# Patient Record
Sex: FEMALE | Race: WHITE | NOT HISPANIC OR LATINO | URBAN - METROPOLITAN AREA
[De-identification: names, ages, dates, MRNs, and addresses within clinical notes are randomized per-mention and may not be internally consistent; named-entity substitution may affect disease eponyms.]

---

## 2017-10-06 ENCOUNTER — IMPORTED ENCOUNTER (OUTPATIENT)
Dept: URBAN - METROPOLITAN AREA CLINIC 31 | Facility: CLINIC | Age: 67
End: 2017-10-06

## 2017-10-06 PROBLEM — H47.211: Noted: 2017-10-06

## 2017-10-06 PROBLEM — H40.002: Noted: 2017-10-06

## 2017-10-06 PROBLEM — H25.13: Noted: 2017-10-06

## 2017-10-06 PROBLEM — H35.3131: Noted: 2017-10-06

## 2017-10-06 PROBLEM — H35.3122: Noted: 2017-10-06

## 2017-10-06 PROCEDURE — 92250 FUNDUS PHOTOGRAPHY W/I&R: CPT

## 2017-10-06 PROCEDURE — 92014 COMPRE OPH EXAM EST PT 1/>: CPT

## 2017-10-06 PROCEDURE — 92015 DETERMINE REFRACTIVE STATE: CPT

## 2017-10-06 NOTE — PATIENT DISCUSSION
1.  ARMD OS dry - Importance of smoking cessation blood pressure control and healthy diet were emphasized. In accordance with the AREDS study a good multivitamin containing EC and Zinc were recommened to be taken daily. Patient was instructed to self monitor their monocular vision (reading/Amsler Grid) at least weekly. Patient should immediately report any new onset of decreased vision or metamorphopsia. Sees Dr. Dhiraj Hughes. Nuclear Sclerotic Cataract OU: Explained how cataracts can effect vision. Recommend clinical observation. The patient was advised to contact us if any change or worsening of vision. 3. Glaucoma suspect OS - No signs of glaucomatous damage to the optic nerve based on todays examination and testing. Will continue to monitor. 4. Optic Atrophy OD  -- The patient has optic atrophy of the right eye which is the likely cause of his/her decreased vision. The condition was explained to the patient.

## 2018-03-12 ENCOUNTER — IMPORTED ENCOUNTER (OUTPATIENT)
Dept: URBAN - METROPOLITAN AREA CLINIC 31 | Facility: CLINIC | Age: 68
End: 2018-03-12

## 2018-03-12 PROCEDURE — 99080 SPECIAL REPORTS OR FORMS: CPT

## 2018-10-04 ENCOUNTER — IMPORTED ENCOUNTER (OUTPATIENT)
Dept: URBAN - METROPOLITAN AREA CLINIC 31 | Facility: CLINIC | Age: 68
End: 2018-10-04

## 2018-10-04 PROBLEM — H25.13: Noted: 2018-10-04

## 2018-10-04 PROBLEM — H40.012: Noted: 2018-10-04

## 2018-10-04 PROBLEM — H47.211: Noted: 2018-10-04

## 2018-10-04 PROBLEM — H35.3122: Noted: 2018-10-04

## 2018-10-04 PROCEDURE — 92014 COMPRE OPH EXAM EST PT 1/>: CPT

## 2018-10-04 PROCEDURE — 92015 DETERMINE REFRACTIVE STATE: CPT

## 2018-10-04 PROCEDURE — 92250 FUNDUS PHOTOGRAPHY W/I&R: CPT

## 2018-10-04 NOTE — PATIENT DISCUSSION
ARMD OS dry - Importance of smoking cessation blood pressure control and healthy diet were emphasized. In accordance with the AREDS study a good multivitamin containing EC and Zinc were recommened to be taken daily. Patient was instructed to self monitor their monocular vision (reading/Amsler Grid) at least weekly. Patient should immediately report any new onset of decreased vision or metamorphopsia.

## 2018-10-04 NOTE — PATIENT DISCUSSION
Optic Atrophy OD  -- The patient has optic atrophy of the right eye which is the likely cause of his/her decreased vision. The condition was explained to the patient.

## 2018-10-04 NOTE — PATIENT DISCUSSION
1.  Glaucoma suspect OS - No signs of glaucomatous damage to the optic nerve based on todays examination and testing. Will continue to monitor. 2. ARMD OS dry - Importance of smoking cessation blood pressure control and healthy diet were emphasized. In accordance with the AREDS study a good multivitamin containing EC and Zinc were recommened to be taken daily. Patient was instructed to self monitor their monocular vision (reading/Amsler Grid) at least weekly. Patient should immediately report any new onset of decreased vision or metamorphopsia. 3. Optic Atrophy OD  -- The patient has optic atrophy of the right eye which is the likely cause of his/her decreased vision. The condition was explained to the patient. 4. Nuclear Sclerotic Cataract OU: Explained how cataracts can effect vision. Recommend clinical observation. The patient was advised to contact us if any change or worsening of vision.

## 2019-08-20 NOTE — PATIENT DISCUSSION
Monitor for macular changes with visits and Mac OCTs. Eat healthy and wear sunglasses. Advised patient to call our office with decreased vision or increased symptoms.

## 2019-08-20 NOTE — PATIENT DISCUSSION
Continue following with PCP for routine care. Stressed importance of keeping A1c levels below 7.0 and monitoring blood sugar. Letter explaining today's findings faxed to patient's PCP.

## 2019-08-20 NOTE — PATIENT DISCUSSION
Monitor for changes. Advised patient to call our office with decreased vision or increased symptoms. Recommend patient to use artificial tears several times a day. Refresh coupon and handout given today.

## 2019-11-05 ENCOUNTER — IMPORTED ENCOUNTER (OUTPATIENT)
Dept: URBAN - METROPOLITAN AREA CLINIC 31 | Facility: CLINIC | Age: 69
End: 2019-11-05

## 2019-11-05 PROBLEM — H47.011: Noted: 2019-11-05

## 2019-11-05 PROBLEM — H25.13: Noted: 2019-11-05

## 2019-11-05 PROBLEM — H40.012: Noted: 2019-11-05

## 2019-11-05 PROBLEM — H47.211: Noted: 2019-11-05

## 2019-11-05 PROBLEM — H35.3131: Noted: 2019-11-05

## 2019-11-05 PROBLEM — H47.312: Noted: 2019-11-05

## 2019-11-05 PROCEDURE — 92014 COMPRE OPH EXAM EST PT 1/>: CPT

## 2019-11-05 PROCEDURE — 92250 FUNDUS PHOTOGRAPHY W/I&R: CPT

## 2019-11-05 PROCEDURE — 92015 DETERMINE REFRACTIVE STATE: CPT

## 2019-11-05 NOTE — PATIENT DISCUSSION
Glaucoma suspect OS - No signs of glaucomatous damage to the optic nerve based on todays examination and testing. Will continue to monitor.

## 2019-11-05 NOTE — PATIENT DISCUSSION
Optic Atrophy OS  -- The patient has optic atrophy of the left eye which is the likely cause of his/her decreased vision. The condition was explained to the patient.

## 2019-11-05 NOTE — PATIENT DISCUSSION
1.  ARMD OU dry - Importance of smoking cessation blood pressure control and healthy diet were emphasized. In accordance with the AREDS study a good multivitamin containing EC and Zinc were recommened to be taken daily. Patient was instructed to self monitor their monocular vision (reading/Amsler Grid) at least weekly. Patient should immediately report any new onset of decreased vision or metamorphopsia. 2. Optic Atrophy OD  -- The patient has optic atrophy of the right eye which is the likely cause of his/her decreased vision. The condition was explained to the patient. 3. Glaucoma suspect OS - No signs of glaucomatous damage to the optic nerve based on todays examination and testing. Will continue to monitor. 4. Nuclear Sclerotic Cataract OU: Explained how cataracts can effect vision. Recommend clinical observation. The patient was advised to contact us if any change or worsening of vision. 5. Ischemic Optic Neuropathy OD - 6. Optic Atrophy OS  -- The patient has optic atrophy of the left eye which is the likely cause of his/her decreased vision. The condition was explained to the patient.

## 2020-02-13 ENCOUNTER — IMPORTED ENCOUNTER (OUTPATIENT)
Dept: URBAN - METROPOLITAN AREA CLINIC 31 | Facility: CLINIC | Age: 70
End: 2020-02-13

## 2020-02-13 PROBLEM — H25.13: Noted: 2020-02-13

## 2020-02-13 PROBLEM — H40.012: Noted: 2020-02-13

## 2020-02-13 PROBLEM — H35.3131: Noted: 2020-02-13

## 2020-02-13 NOTE — PATIENT DISCUSSION
1.  Nuclear Sclerotic Cataract OU: Explained how cataracts can effect vision. Recommend clinical observation. The patient was advised to contact us if any change or worsening of vision. 2. ARMD OU dry - Importance of smoking cessation blood pressure control and healthy diet were emphasized. In accordance with the AREDS study a good multivitamin containing EC and Zinc were recommened to be taken daily. Patient was instructed to self monitor their monocular vision (reading/Amsler Grid) at least weekly. Patient should immediately report any new onset of decreased vision or metamorphopsia. 3. Glaucoma suspect OS - No signs of glaucoma starting based on todays examination and testing. Will continue to monitor for development.  Rx remake OS

## 2020-02-13 NOTE — PATIENT DISCUSSION
Glaucoma suspect OS - No signs of glaucoma starting based on todays examination and testing. Will continue to monitor for development.

## 2020-11-07 NOTE — PATIENT DISCUSSION
Refuses ABG.  Requests addition 60 minutes to control breathing rate to normal level     Mally Shah RN  11/07/20 0010 Stable.

## 2022-04-02 ASSESSMENT — TONOMETRY
OS_IOP_MMHG: 11
OD_IOP_MMHG: 14
OD_IOP_MMHG: 11
OS_IOP_MMHG: 13
OS_IOP_MMHG: 11
OD_IOP_MMHG: 11

## 2022-04-02 ASSESSMENT — VISUAL ACUITY
OS_CC: J214''
OS_CC: J516''
OS_SC: 20/30-3
OS_SC: 20/30-2
OS_SC: 20/25-2
OS_CC: J314''
OS_SC: 20/20-3

## 2023-11-14 NOTE — PATIENT DISCUSSION
CHW - Initial Contact    This Community Health Worker completed the Social Determinant of Health questionnaire with MRN [id number] over the phone today.    Pt identified barriers of most importance are: No barriers reported   Referrals to community agencies completed with patient/caregiver consent outside of [facility] include: No  Referrals were put through [facility] - No  Support and Services: No support & services have been documented.  Other information discussed the patient needs / wants help with: SDOH completed. No assistance requested at this time.   Follow up required: No  No future outreach task assigned       Monitor for changes. Advised patient to call our office with decreased vision or increased symptoms. Recommend patient to use artificial tears several times a day. Refresh coupon and handout given today.

## 2024-08-30 ENCOUNTER — PATIENT OUTREACH (OUTPATIENT)
Dept: CARE COORDINATION | Facility: CLINIC | Age: 74
End: 2024-08-30

## 2024-08-30 NOTE — PROGRESS NOTES
Outreach call to patient to support a smooth transition of care from recent admission.  Spoke with patient, reviewed discharge medications, discharge instructions, and provided education on importance of follow-up appointment with provider.  Will continue to monitor through transition period.  Medications  Medications reviewed with patient/caregiver?: Yes (8/30/2024  9:06 AM)  Is the patient having any side effects they believe may be caused by any medication additions or changes?: No (8/30/2024  9:06 AM)  Does the patient have all medications ordered at discharge?: Yes (8/30/2024  9:06 AM)  Care Management Interventions: No intervention needed (8/30/2024  9:06 AM)  Is the patient taking all medications as directed (includes completed medication regime)?: Yes (8/30/2024  9:06 AM)    Appointments  Does the patient have a primary care provider?: Yes (8/30/2024  9:06 AM)  Care Management Interventions: Advised patient to make appointment (8/30/2024  9:06 AM)  Has the patient kept scheduled appointments due by today?: Yes (8/30/2024  9:06 AM)  Care Management Interventions: Advised to schedule with specialist (8/30/2024  9:06 AM)    Patient Teaching  Does the patient have access to their discharge instructions?: Yes (8/30/2024  9:06 AM)  Care Management Interventions: Reviewed instructions with patient (8/30/2024  9:06 AM)  What is the patient's perception of their health status since discharge?: Improving (8/30/2024  9:06 AM)      Angelic AGUSTIN, RN, [facility]  O: [phone]

## 2024-09-03 ENCOUNTER — PATIENT OUTREACH (OUTPATIENT)
Dept: CARE COORDINATION | Facility: CLINIC | Age: 74
End: 2024-09-03

## 2024-09-03 NOTE — PROGRESS NOTES
Outreach call to patient following up on appointment with primary care provider.  Her appointment is this afternoon with Dr. Avila. Patient with no additional questions at this time.  Will continue to follow.    Angelic THOMASN, RN, [facility]  O: [phone]

## 2024-09-30 ENCOUNTER — PATIENT OUTREACH (OUTPATIENT)
Dept: CARE COORDINATION | Facility: CLINIC | Age: 74
End: 2024-09-30

## 2024-09-30 NOTE — PROGRESS NOTES
Outreach call to patient to check in 30 days after hospital discharge to support smooth transition of care.  Patient with no additional needs noted. No additional outreach needed at this time.   Angelic THOMASN, RN, [facility] Organization  O: [phone]

## 2024-10-20 ENCOUNTER — APPOINTMENT (OUTPATIENT)
Dept: RADIOLOGY | Facility: HOSPITAL | Age: 74
End: 2024-10-20
Payer: MEDICARE

## 2024-10-20 ENCOUNTER — HOSPITAL ENCOUNTER (INPATIENT)
Facility: HOSPITAL | Age: 74
LOS: 6 days | Discharge: NURSING, REHAB OR LONG-TERM CARE | End: 2024-10-27
Attending: INTERNAL MEDICINE | Admitting: INTERNAL MEDICINE
Payer: MEDICARE

## 2024-10-20 DIAGNOSIS — S72.002A: Primary | ICD-10-CM

## 2024-10-20 DIAGNOSIS — J43.1 PANLOBULAR EMPHYSEMA (MULTI): ICD-10-CM

## 2024-10-20 DIAGNOSIS — I89.0 LYMPHEDEMA: ICD-10-CM
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
## 2024-10-20 DIAGNOSIS — M79.662 PAIN OF LEFT CALF: ICD-10-CM

## 2024-10-20 PROCEDURE — 72170 X-RAY EXAM OF PELVIS: CPT | Performed by: RADIOLOGY

## 2024-10-20 PROCEDURE — 71045 X-RAY EXAM CHEST 1 VIEW: CPT

## 2024-10-20 PROCEDURE — 73030 X-RAY EXAM OF SHOULDER: CPT | Mod: LEFT SIDE | Performed by: RADIOLOGY

## 2024-10-20 PROCEDURE — 73030 X-RAY EXAM OF SHOULDER: CPT | Mod: LT

## 2024-10-20 PROCEDURE — 73552 X-RAY EXAM OF FEMUR 2/>: CPT | Mod: LT

## 2024-10-20 PROCEDURE — 73552 X-RAY EXAM OF FEMUR 2/>: CPT | Performed by: RADIOLOGY

## 2024-10-20 PROCEDURE — 99285 EMERGENCY DEPT VISIT HI MDM: CPT

## 2024-10-20 PROCEDURE — 71045 X-RAY EXAM CHEST 1 VIEW: CPT | Performed by: RADIOLOGY

## 2024-10-20 PROCEDURE — 72170 X-RAY EXAM OF PELVIS: CPT

## 2024-10-20 ASSESSMENT — PAIN DESCRIPTION - LOCATION: LOCATION: HIP

## 2024-10-20 ASSESSMENT — PAIN DESCRIPTION - ORIENTATION: ORIENTATION: LEFT

## 2024-10-20 ASSESSMENT — PAIN DESCRIPTION - PAIN TYPE: TYPE: ACUTE PAIN

## 2024-10-20 ASSESSMENT — PAIN - FUNCTIONAL ASSESSMENT: PAIN_FUNCTIONAL_ASSESSMENT: 0-10

## 2024-10-20 ASSESSMENT — PAIN DESCRIPTION - DESCRIPTORS: DESCRIPTORS: ACHING;SORE

## 2024-10-20 ASSESSMENT — LIFESTYLE VARIABLES
EVER FELT BAD OR GUILTY ABOUT YOUR DRINKING: NO
TOTAL SCORE: 0
HAVE PEOPLE ANNOYED YOU BY CRITICIZING YOUR DRINKING: NO
HAVE YOU EVER FELT YOU SHOULD CUT DOWN ON YOUR DRINKING: NO
EVER HAD A DRINK FIRST THING IN THE MORNING TO STEADY YOUR NERVES TO GET RID OF A HANGOVER: NO

## 2024-10-20 ASSESSMENT — PAIN SCALES - GENERAL: PAINLEVEL_OUTOF10: 8

## 2024-10-20 ASSESSMENT — COLUMBIA-SUICIDE SEVERITY RATING SCALE - C-SSRS
6. HAVE YOU EVER DONE ANYTHING, STARTED TO DO ANYTHING, OR PREPARED TO DO ANYTHING TO END YOUR LIFE?: NO
1. IN THE PAST MONTH, HAVE YOU WISHED YOU WERE DEAD OR WISHED YOU COULD GO TO SLEEP AND NOT WAKE UP?: NO
2. HAVE YOU ACTUALLY HAD ANY THOUGHTS OF KILLING YOURSELF?: NO

## 2024-10-21 ENCOUNTER — ANESTHESIA EVENT (OUTPATIENT)
Dept: OPERATING ROOM | Facility: HOSPITAL | Age: 74
End: 2024-10-21
Payer: MEDICARE

## 2024-10-21 ENCOUNTER — APPOINTMENT (OUTPATIENT)
Dept: RADIOLOGY | Facility: HOSPITAL | Age: 74
DRG: 521 | End: 2024-10-21
Payer: MEDICARE

## 2024-10-21 ENCOUNTER — APPOINTMENT (OUTPATIENT)
Dept: RADIOLOGY | Facility: HOSPITAL | Age: 74
End: 2024-10-21
Payer: MEDICARE

## 2024-10-21 ENCOUNTER — APPOINTMENT (OUTPATIENT)
Dept: CARDIOLOGY | Facility: HOSPITAL | Age: 74
DRG: 521 | End: 2024-10-21
Payer: MEDICARE

## 2024-10-21 PROBLEM — M54.16 LUMBAR RADICULOPATHY: Status: ACTIVE | Noted: 2017-05-10

## 2024-10-21 PROBLEM — Z86.718 HISTORY OF DVT (DEEP VEIN THROMBOSIS): Chronic | Status: ACTIVE | Noted: 2019-05-15

## 2024-10-21 PROBLEM — J44.9 COPD (CHRONIC OBSTRUCTIVE PULMONARY DISEASE) (MULTI): Status: ACTIVE | Noted: 2020-10-22

## 2024-10-21 PROBLEM — I50.32 CHRONIC DIASTOLIC HEART FAILURE: Status: ACTIVE | Noted: 2022-01-12

## 2024-10-21 PROBLEM — E87.5 HYPERKALEMIA: Status: ACTIVE | Noted: 2021-05-24

## 2024-10-21 PROBLEM — N18.30 CKD (CHRONIC KIDNEY DISEASE) STAGE 3, GFR 30-59 ML/MIN (MULTI): Status: ACTIVE | Noted: 2022-05-09

## 2024-10-21 PROBLEM — J96.11 CHRONIC RESPIRATORY FAILURE WITH HYPOXIA (MULTI): Status: ACTIVE | Noted: 2022-02-18

## 2024-10-21 PROBLEM — Z86.711 HISTORY OF PULMONARY EMBOLUS (PE): Chronic | Status: ACTIVE | Noted: 2019-05-15

## 2024-10-21 PROBLEM — G89.29 CHRONIC LOW BACK PAIN: Chronic | Status: ACTIVE | Noted: 2019-05-15

## 2024-10-21 PROBLEM — F17.200 NICOTINE USE DISORDER: Status: ACTIVE | Noted: 2019-05-15

## 2024-10-21 PROBLEM — M54.50 CHRONIC LOW BACK PAIN: Chronic | Status: ACTIVE | Noted: 2019-05-15

## 2024-10-21 PROBLEM — I82.409 DVT (DEEP VENOUS THROMBOSIS) (MULTI): Status: ACTIVE | Noted: 2024-04-18

## 2024-10-21 PROBLEM — D68.51: Chronic | Status: ACTIVE | Noted: 2019-05-15

## 2024-10-21 PROBLEM — D64.9 CHRONIC ANEMIA: Status: ACTIVE | Noted: 2021-10-13

## 2024-10-21 PROBLEM — S72.002A: Status: ACTIVE | Noted: 2024-10-21

## 2024-10-21 PROBLEM — I89.0 LYMPHEDEMA: Status: ACTIVE | Noted: 2024-10-08

## 2024-10-21 LAB
ABO GROUP (TYPE) IN BLOOD: NORMAL
ALBUMIN SERPL BCP-MCNC: 3.2 G/DL (ref 3.4–5)
ALP SERPL-CCNC: 100 U/L (ref 33–136)
ALT SERPL W P-5'-P-CCNC: 16 U/L (ref 7–45)
ANION GAP BLDA CALCULATED.4IONS-SCNC: -2 MMO/L (ref 10–25)
ANION GAP BLDA CALCULATED.4IONS-SCNC: -3 MMO/L (ref 10–25)
ANION GAP BLDA CALCULATED.4IONS-SCNC: -4 MMO/L (ref 10–25)
ANION GAP SERPL CALC-SCNC: <7 MMOL/L (ref 10–20)
ANION GAP SERPL CALC-SCNC: <7 MMOL/L (ref 10–20)
ANTIBODY SCREEN: NORMAL
APPARATUS: ABNORMAL
APTT PPP: 41 SECONDS (ref 27–38)
ARTERIAL PATENCY WRIST A: POSITIVE
AST SERPL W P-5'-P-CCNC: 15 U/L (ref 9–39)
BASE EXCESS BLDA CALC-SCNC: 14.1 MMOL/L (ref -2–3)
BASE EXCESS BLDA CALC-SCNC: 15.4 MMOL/L (ref -2–3)
BASE EXCESS BLDA CALC-SCNC: 15.6 MMOL/L (ref -2–3)
BASOPHILS # BLD AUTO: 0.01 X10*3/UL (ref 0–0.1)
BASOPHILS NFR BLD AUTO: 0.2 %
BILIRUB SERPL-MCNC: 0.2 MG/DL (ref 0–1.2)
BODY TEMPERATURE: ABNORMAL
BUN SERPL-MCNC: 26 MG/DL (ref 6–23)
BUN SERPL-MCNC: 26 MG/DL (ref 6–23)
CA-I BLDA-SCNC: 1.23 MMOL/L (ref 1.1–1.33)
CA-I BLDA-SCNC: 1.28 MMOL/L (ref 1.1–1.33)
CA-I BLDA-SCNC: 1.3 MMOL/L (ref 1.1–1.33)
CALCIUM SERPL-MCNC: 8.7 MG/DL (ref 8.6–10.3)
CALCIUM SERPL-MCNC: 8.7 MG/DL (ref 8.6–10.3)
CHLORIDE BLDA-SCNC: 102 MMOL/L (ref 98–107)
CHLORIDE BLDA-SCNC: 104 MMOL/L (ref 98–107)
CHLORIDE BLDA-SCNC: 104 MMOL/L (ref 98–107)
CHLORIDE SERPL-SCNC: 102 MMOL/L (ref 98–107)
CHLORIDE SERPL-SCNC: 102 MMOL/L (ref 98–107)
CO2 SERPL-SCNC: 40 MMOL/L (ref 21–32)
CO2 SERPL-SCNC: 40 MMOL/L (ref 21–32)
CREAT SERPL-MCNC: 0.98 MG/DL (ref 0.5–1.05)
CREAT SERPL-MCNC: 1.14 MG/DL (ref 0.5–1.05)
CRITICAL CALL TIME: 1037
CRITICAL CALL TIME: 1944
CRITICAL CALL TIME: 2353
CRITICAL CALLED BY: ABNORMAL
CRITICAL CALLED TO: ABNORMAL
CRITICAL READ BACK: ABNORMAL
EGFRCR SERPLBLD CKD-EPI 2021: 51 ML/MIN/1.73M*2
EGFRCR SERPLBLD CKD-EPI 2021: 61 ML/MIN/1.73M*2
EOSINOPHIL # BLD AUTO: 0.12 X10*3/UL (ref 0–0.4)
EOSINOPHIL NFR BLD AUTO: 2 %
EPAP CMH2O: 8 CM H2O
EPAP CMH2O: 8 CM H2O
ERYTHROCYTE [DISTWIDTH] IN BLOOD BY AUTOMATED COUNT: 14.3 % (ref 11.5–14.5)
ERYTHROCYTE [DISTWIDTH] IN BLOOD BY AUTOMATED COUNT: 14.3 % (ref 11.5–14.5)
FLOW: 2 LPM
GLUCOSE BLDA-MCNC: 110 MG/DL (ref 74–99)
GLUCOSE BLDA-MCNC: 174 MG/DL (ref 74–99)
GLUCOSE BLDA-MCNC: 183 MG/DL (ref 74–99)
GLUCOSE SERPL-MCNC: 112 MG/DL (ref 74–99)
GLUCOSE SERPL-MCNC: 114 MG/DL (ref 74–99)
HCO3 BLDA-SCNC: 43.8 MMOL/L (ref 22–26)
HCO3 BLDA-SCNC: 45.3 MMOL/L (ref 22–26)
HCO3 BLDA-SCNC: 45.3 MMOL/L (ref 22–26)
HCT VFR BLD AUTO: 32.2 % (ref 36–46)
HCT VFR BLD AUTO: 35.9 % (ref 36–46)
HCT VFR BLD EST: 33 % (ref 36–46)
HCT VFR BLD EST: 33 % (ref 36–46)
HCT VFR BLD EST: 34 % (ref 36–46)
HGB BLD-MCNC: 10.6 G/DL (ref 12–16)
HGB BLD-MCNC: 9.5 G/DL (ref 12–16)
HGB BLDA-MCNC: 10.9 G/DL (ref 12–16)
HGB BLDA-MCNC: 11 G/DL (ref 12–16)
HGB BLDA-MCNC: 11.4 G/DL (ref 12–16)
IMM GRANULOCYTES # BLD AUTO: 0.05 X10*3/UL (ref 0–0.5)
IMM GRANULOCYTES NFR BLD AUTO: 0.8 % (ref 0–0.9)
INHALED O2 CONCENTRATION: 28 %
INHALED O2 CONCENTRATION: 50 %
INHALED O2 CONCENTRATION: 50 %
INR PPP: 1.6 (ref 0.9–1.1)
INR PPP: 2 (ref 0.9–1.1)
INR PPP: 2.3 (ref 0.9–1.1)
INR PPP: 2.4 (ref 0.9–1.1)
IPAP CMH2O: 15 CM H2O
IPAP CMH2O: 20 CM H2O
LACTATE BLDA-SCNC: 0.5 MMOL/L (ref 0.4–2)
LACTATE BLDA-SCNC: 0.6 MMOL/L (ref 0.4–2)
LACTATE BLDA-SCNC: 0.6 MMOL/L (ref 0.4–2)
LYMPHOCYTES # BLD AUTO: 0.75 X10*3/UL (ref 0.8–3)
LYMPHOCYTES NFR BLD AUTO: 12.4 %
MAGNESIUM SERPL-MCNC: 1.83 MG/DL (ref 1.6–2.4)
MCH RBC QN AUTO: 30.5 PG (ref 26–34)
MCH RBC QN AUTO: 30.9 PG (ref 26–34)
MCHC RBC AUTO-ENTMCNC: 29.5 G/DL (ref 32–36)
MCHC RBC AUTO-ENTMCNC: 29.5 G/DL (ref 32–36)
MCV RBC AUTO: 104 FL (ref 80–100)
MCV RBC AUTO: 105 FL (ref 80–100)
MONOCYTES # BLD AUTO: 0.66 X10*3/UL (ref 0.05–0.8)
MONOCYTES NFR BLD AUTO: 10.9 %
NEUTROPHILS # BLD AUTO: 4.47 X10*3/UL (ref 1.6–5.5)
NEUTROPHILS NFR BLD AUTO: 73.7 %
NRBC BLD-RTO: 0 /100 WBCS (ref 0–0)
NRBC BLD-RTO: 0 /100 WBCS (ref 0–0)
OXYHGB MFR BLDA: 95.6 % (ref 94–98)
OXYHGB MFR BLDA: 96.8 % (ref 94–98)
OXYHGB MFR BLDA: 97.5 % (ref 94–98)
PCO2 BLDA: 88 MM HG (ref 38–42)
PCO2 BLDA: 89 MM HG (ref 38–42)
PCO2 BLDA: 92 MM HG (ref 38–42)
PH BLDA: 7.3 PH (ref 7.38–7.42)
PH BLDA: 7.3 PH (ref 7.38–7.42)
PH BLDA: 7.32 PH (ref 7.38–7.42)
PLATELET # BLD AUTO: 226 X10*3/UL (ref 150–450)
PLATELET # BLD AUTO: 272 X10*3/UL (ref 150–450)
PO2 BLDA: 104 MM HG (ref 85–95)
PO2 BLDA: 115 MM HG (ref 85–95)
PO2 BLDA: 76 MM HG (ref 85–95)
POTASSIUM BLDA-SCNC: 5.6 MMOL/L (ref 3.5–5.3)
POTASSIUM BLDA-SCNC: 6.3 MMOL/L (ref 3.5–5.3)
POTASSIUM BLDA-SCNC: 6.7 MMOL/L (ref 3.5–5.3)
POTASSIUM SERPL-SCNC: 5.3 MMOL/L (ref 3.5–5.3)
POTASSIUM SERPL-SCNC: 5.4 MMOL/L (ref 3.5–5.3)
PROT SERPL-MCNC: 5.8 G/DL (ref 6.4–8.2)
PROTHROMBIN TIME: 17.8 SECONDS (ref 9.8–12.8)
PROTHROMBIN TIME: 23 SECONDS (ref 9.8–12.8)
PROTHROMBIN TIME: 26.5 SECONDS (ref 9.8–12.8)
PROTHROMBIN TIME: 27.6 SECONDS (ref 9.8–12.8)
RBC # BLD AUTO: 3.07 X10*6/UL (ref 4–5.2)
RBC # BLD AUTO: 3.47 X10*6/UL (ref 4–5.2)
RH FACTOR (ANTIGEN D): NORMAL
SAO2 % BLDA: 97 % (ref 94–100)
SAO2 % BLDA: 98 % (ref 94–100)
SAO2 % BLDA: 99 % (ref 94–100)
SODIUM BLDA-SCNC: 138 MMOL/L (ref 136–145)
SODIUM BLDA-SCNC: 139 MMOL/L (ref 136–145)
SODIUM BLDA-SCNC: 140 MMOL/L (ref 136–145)
SODIUM SERPL-SCNC: 143 MMOL/L (ref 136–145)
SODIUM SERPL-SCNC: 143 MMOL/L (ref 136–145)
SPECIMEN DRAWN FROM PATIENT: ABNORMAL
VENTILATOR RATE: 24 BPM
VIT B12 SERPL-MCNC: 193 PG/ML (ref 211–911)
WBC # BLD AUTO: 6.1 X10*3/UL (ref 4.4–11.3)
WBC # BLD AUTO: 7.8 X10*3/UL (ref 4.4–11.3)

## 2024-10-21 PROCEDURE — 85610 PROTHROMBIN TIME: CPT | Performed by: INTERNAL MEDICINE

## 2024-10-21 PROCEDURE — 99291 CRITICAL CARE FIRST HOUR: CPT

## 2024-10-21 PROCEDURE — 5A09457 ASSISTANCE WITH RESPIRATORY VENTILATION, 24-96 CONSECUTIVE HOURS, CONTINUOUS POSITIVE AIRWAY PRESSURE: ICD-10-PCS | Performed by: INTERNAL MEDICINE

## 2024-10-21 PROCEDURE — 73700 CT LOWER EXTREMITY W/O DYE: CPT | Mod: LT

## 2024-10-21 PROCEDURE — 36600 WITHDRAWAL OF ARTERIAL BLOOD: CPT

## 2024-10-21 PROCEDURE — 36415 COLL VENOUS BLD VENIPUNCTURE: CPT | Performed by: INTERNAL MEDICINE

## 2024-10-21 PROCEDURE — 82607 VITAMIN B-12: CPT | Performed by: HOSPITALIST

## 2024-10-21 PROCEDURE — 2500000005 HC RX 250 GENERAL PHARMACY W/O HCPCS

## 2024-10-21 PROCEDURE — 85610 PROTHROMBIN TIME: CPT | Performed by: HOSPITALIST

## 2024-10-21 PROCEDURE — 71045 X-RAY EXAM CHEST 1 VIEW: CPT

## 2024-10-21 PROCEDURE — 2500000002 HC RX 250 W HCPCS SELF ADMINISTERED DRUGS (ALT 637 FOR MEDICARE OP, ALT 636 FOR OP/ED): Performed by: INTERNAL MEDICINE

## 2024-10-21 PROCEDURE — 2500000004 HC RX 250 GENERAL PHARMACY W/ HCPCS (ALT 636 FOR OP/ED): Performed by: NURSE PRACTITIONER

## 2024-10-21 PROCEDURE — 2500000005 HC RX 250 GENERAL PHARMACY W/O HCPCS: Performed by: HOSPITALIST

## 2024-10-21 PROCEDURE — 2500000001 HC RX 250 WO HCPCS SELF ADMINISTERED DRUGS (ALT 637 FOR MEDICARE OP)

## 2024-10-21 PROCEDURE — 99232 SBSQ HOSP IP/OBS MODERATE 35: CPT | Performed by: HOSPITALIST

## 2024-10-21 PROCEDURE — 85610 PROTHROMBIN TIME: CPT | Performed by: NURSE PRACTITIONER

## 2024-10-21 PROCEDURE — 76377 3D RENDER W/INTRP POSTPROCES: CPT

## 2024-10-21 PROCEDURE — 99223 1ST HOSP IP/OBS HIGH 75: CPT | Performed by: INTERNAL MEDICINE

## 2024-10-21 PROCEDURE — 80048 BASIC METABOLIC PNL TOTAL CA: CPT | Mod: CCI | Performed by: INTERNAL MEDICINE

## 2024-10-21 PROCEDURE — 86901 BLOOD TYPING SEROLOGIC RH(D): CPT | Performed by: INTERNAL MEDICINE

## 2024-10-21 PROCEDURE — 94660 CPAP INITIATION&MGMT: CPT

## 2024-10-21 PROCEDURE — 2500000001 HC RX 250 WO HCPCS SELF ADMINISTERED DRUGS (ALT 637 FOR MEDICARE OP): Performed by: HOSPITALIST

## 2024-10-21 PROCEDURE — 84132 ASSAY OF SERUM POTASSIUM: CPT

## 2024-10-21 PROCEDURE — 93010 ELECTROCARDIOGRAM REPORT: CPT | Performed by: INTERNAL MEDICINE

## 2024-10-21 PROCEDURE — 94762 N-INVAS EAR/PLS OXIMTRY CONT: CPT

## 2024-10-21 PROCEDURE — 94640 AIRWAY INHALATION TREATMENT: CPT

## 2024-10-21 PROCEDURE — 2500000001 HC RX 250 WO HCPCS SELF ADMINISTERED DRUGS (ALT 637 FOR MEDICARE OP): Performed by: INTERNAL MEDICINE

## 2024-10-21 PROCEDURE — 2500000004 HC RX 250 GENERAL PHARMACY W/ HCPCS (ALT 636 FOR OP/ED): Performed by: HOSPITALIST

## 2024-10-21 PROCEDURE — 2020000001 HC ICU ROOM DAILY

## 2024-10-21 PROCEDURE — 85730 THROMBOPLASTIN TIME PARTIAL: CPT | Performed by: INTERNAL MEDICINE

## 2024-10-21 PROCEDURE — 85027 COMPLETE CBC AUTOMATED: CPT | Performed by: INTERNAL MEDICINE

## 2024-10-21 PROCEDURE — 80053 COMPREHEN METABOLIC PANEL: CPT | Performed by: INTERNAL MEDICINE

## 2024-10-21 PROCEDURE — 84132 ASSAY OF SERUM POTASSIUM: CPT | Performed by: HOSPITALIST

## 2024-10-21 PROCEDURE — 71045 X-RAY EXAM CHEST 1 VIEW: CPT | Performed by: RADIOLOGY

## 2024-10-21 PROCEDURE — 83735 ASSAY OF MAGNESIUM: CPT | Performed by: INTERNAL MEDICINE

## 2024-10-21 PROCEDURE — 99221 1ST HOSP IP/OBS SF/LOW 40: CPT | Performed by: ORTHOPAEDIC SURGERY

## 2024-10-21 PROCEDURE — 2500000002 HC RX 250 W HCPCS SELF ADMINISTERED DRUGS (ALT 637 FOR MEDICARE OP, ALT 636 FOR OP/ED)

## 2024-10-21 PROCEDURE — 85025 COMPLETE CBC W/AUTO DIFF WBC: CPT | Performed by: INTERNAL MEDICINE

## 2024-10-21 PROCEDURE — 93005 ELECTROCARDIOGRAM TRACING: CPT

## 2024-10-21 RX ORDER — POLYETHYLENE GLYCOL 3350 17 G/17G
17 POWDER, FOR SOLUTION ORAL DAILY
Status: DISCONTINUED | OUTPATIENT
Start: 2024-10-21 | End: 2024-10-27 | Stop reason: HOSPADM

## 2024-10-21 RX ORDER — ACETAMINOPHEN 325 MG/1
650 TABLET ORAL EVERY 4 HOURS PRN
Status: DISCONTINUED | OUTPATIENT
Start: 2024-10-21 | End: 2024-10-27 | Stop reason: HOSPADM

## 2024-10-21 RX ORDER — CALCIUM CARBONATE 300MG(750)
500 TABLET,CHEWABLE ORAL DAILY
COMMUNITY

## 2024-10-21 RX ORDER — IPRATROPIUM BROMIDE AND ALBUTEROL SULFATE 2.5; .5 MG/3ML; MG/3ML
3 SOLUTION RESPIRATORY (INHALATION) 4 TIMES DAILY PRN
Status: DISCONTINUED | OUTPATIENT
Start: 2024-10-21 | End: 2024-10-21

## 2024-10-21 RX ORDER — WARFARIN 2.5 MG/1
TABLET ORAL
COMMUNITY

## 2024-10-21 RX ORDER — HYDROCODONE BITARTRATE AND ACETAMINOPHEN 5; 325 MG/1; MG/1
1 TABLET ORAL ONCE
Status: COMPLETED | OUTPATIENT
Start: 2024-10-21 | End: 2024-10-21

## 2024-10-21 RX ORDER — CARVEDILOL 6.25 MG/1
6.25 TABLET ORAL EVERY 12 HOURS
COMMUNITY

## 2024-10-21 RX ORDER — MONTELUKAST SODIUM 10 MG/1
1 TABLET ORAL NIGHTLY
COMMUNITY
Start: 2024-06-25 | End: 2025-06-25

## 2024-10-21 RX ORDER — ACETAMINOPHEN 160 MG/5ML
650 SOLUTION ORAL EVERY 4 HOURS PRN
Status: DISCONTINUED | OUTPATIENT
Start: 2024-10-21 | End: 2024-10-27 | Stop reason: HOSPADM

## 2024-10-21 RX ORDER — WARFARIN SODIUM 5 MG/1
TABLET ORAL
COMMUNITY
Start: 2024-07-10

## 2024-10-21 RX ORDER — IPRATROPIUM BROMIDE AND ALBUTEROL SULFATE 2.5; .5 MG/3ML; MG/3ML
3 SOLUTION RESPIRATORY (INHALATION)
Status: DISCONTINUED | OUTPATIENT
Start: 2024-10-21 | End: 2024-10-26

## 2024-10-21 RX ORDER — TORSEMIDE 20 MG/1
TABLET ORAL
COMMUNITY
Start: 2024-09-03

## 2024-10-21 RX ORDER — DULOXETIN HYDROCHLORIDE 30 MG/1
60 CAPSULE, DELAYED RELEASE ORAL DAILY
Status: DISCONTINUED | OUTPATIENT
Start: 2024-10-21 | End: 2024-10-27 | Stop reason: HOSPADM

## 2024-10-21 RX ORDER — DEXTROMETHORPHAN HYDROBROMIDE, GUAIFENESIN 5; 100 MG/5ML; MG/5ML
650 LIQUID ORAL AS NEEDED
COMMUNITY

## 2024-10-21 RX ORDER — PHYTONADIONE 5 MG/1
5 TABLET ORAL ONCE
Status: COMPLETED | OUTPATIENT
Start: 2024-10-21 | End: 2024-10-21

## 2024-10-21 RX ORDER — PANTOPRAZOLE SODIUM 40 MG/10ML
40 INJECTION, POWDER, LYOPHILIZED, FOR SOLUTION INTRAVENOUS DAILY
Status: DISCONTINUED | OUTPATIENT
Start: 2024-10-21 | End: 2024-10-22

## 2024-10-21 RX ORDER — PANTOPRAZOLE SODIUM 40 MG/1
40 TABLET, DELAYED RELEASE ORAL DAILY
Status: DISCONTINUED | OUTPATIENT
Start: 2024-10-21 | End: 2024-10-22

## 2024-10-21 RX ORDER — NEOMYCIN SULFATE, POLYMYXIN B SULFATE, AND DEXAMETHASONE 3.5; 10000; 1 MG/G; [USP'U]/G; MG/G
OINTMENT OPHTHALMIC 4 TIMES DAILY
COMMUNITY

## 2024-10-21 RX ORDER — HYDROCODONE BITARTRATE AND ACETAMINOPHEN 5; 325 MG/1; MG/1
1 TABLET ORAL EVERY 6 HOURS PRN
Status: DISCONTINUED | OUTPATIENT
Start: 2024-10-21 | End: 2024-10-27 | Stop reason: HOSPADM

## 2024-10-21 RX ORDER — LOVASTATIN 40 MG/1
1 TABLET ORAL NIGHTLY
COMMUNITY
Start: 2024-01-16

## 2024-10-21 RX ORDER — IPRATROPIUM BROMIDE AND ALBUTEROL SULFATE 2.5; .5 MG/3ML; MG/3ML
3 SOLUTION RESPIRATORY (INHALATION) 3 TIMES DAILY
COMMUNITY
Start: 2024-09-13 | End: 2024-12-12

## 2024-10-21 RX ORDER — ALPRAZOLAM 0.5 MG/1
0.5 TABLET ORAL ONCE
Status: COMPLETED | OUTPATIENT
Start: 2024-10-21 | End: 2024-10-21

## 2024-10-21 RX ORDER — ACETAMINOPHEN 650 MG/1
650 SUPPOSITORY RECTAL EVERY 4 HOURS PRN
Status: DISCONTINUED | OUTPATIENT
Start: 2024-10-21 | End: 2024-10-27 | Stop reason: HOSPADM

## 2024-10-21 RX ORDER — PRAMIPEXOLE DIHYDROCHLORIDE 0.5 MG/1
TABLET ORAL
COMMUNITY

## 2024-10-21 RX ORDER — ONDANSETRON 4 MG/1
4 TABLET, FILM COATED ORAL EVERY 8 HOURS PRN
Status: DISCONTINUED | OUTPATIENT
Start: 2024-10-21 | End: 2024-10-21

## 2024-10-21 RX ORDER — ONDANSETRON HYDROCHLORIDE 2 MG/ML
4 INJECTION, SOLUTION INTRAVENOUS EVERY 4 HOURS PRN
Status: DISCONTINUED | OUTPATIENT
Start: 2024-10-21 | End: 2024-10-27 | Stop reason: HOSPADM

## 2024-10-21 RX ORDER — TORSEMIDE 20 MG/1
20 TABLET ORAL DAILY
Status: DISCONTINUED | OUTPATIENT
Start: 2024-10-21 | End: 2024-10-27 | Stop reason: HOSPADM

## 2024-10-21 RX ORDER — LOSARTAN POTASSIUM 50 MG/1
TABLET ORAL
COMMUNITY

## 2024-10-21 RX ORDER — PREGABALIN 75 MG/1
75 CAPSULE ORAL 3 TIMES DAILY
COMMUNITY
End: 2024-10-27 | Stop reason: HOSPADM

## 2024-10-21 RX ORDER — DULOXETIN HYDROCHLORIDE 60 MG/1
60 CAPSULE, DELAYED RELEASE ORAL DAILY
COMMUNITY

## 2024-10-21 RX ORDER — TALC
3 POWDER (GRAM) TOPICAL NIGHTLY PRN
Status: DISCONTINUED | OUTPATIENT
Start: 2024-10-21 | End: 2024-10-27 | Stop reason: HOSPADM

## 2024-10-21 RX ORDER — ONDANSETRON HYDROCHLORIDE 2 MG/ML
4 INJECTION, SOLUTION INTRAVENOUS EVERY 8 HOURS PRN
Status: DISCONTINUED | OUTPATIENT
Start: 2024-10-21 | End: 2024-10-21

## 2024-10-21 RX ORDER — ALBUTEROL SULFATE 90 UG/1
2 INHALANT RESPIRATORY (INHALATION) EVERY 4 HOURS PRN
COMMUNITY
Start: 2024-06-25

## 2024-10-21 RX ORDER — METOLAZONE 2.5 MG/1
2.5 TABLET ORAL
COMMUNITY
Start: 2023-10-09

## 2024-10-21 RX ORDER — NYSTATIN 100000 [USP'U]/G
1 POWDER TOPICAL AS NEEDED
COMMUNITY

## 2024-10-21 RX ORDER — AMITRIPTYLINE HYDROCHLORIDE 75 MG/1
TABLET ORAL
COMMUNITY
End: 2024-10-27 | Stop reason: HOSPADM

## 2024-10-21 RX ORDER — CARVEDILOL 6.25 MG/1
6.25 TABLET ORAL 2 TIMES DAILY
Status: DISCONTINUED | OUTPATIENT
Start: 2024-10-21 | End: 2024-10-27 | Stop reason: HOSPADM

## 2024-10-21 RX ORDER — FERROUS SULFATE, DRIED 160(50) MG
1 TABLET, EXTENDED RELEASE ORAL DAILY
COMMUNITY

## 2024-10-21 SDOH — SOCIAL STABILITY: SOCIAL INSECURITY: DO YOU FEEL UNSAFE GOING BACK TO THE PLACE WHERE YOU ARE LIVING?: NO

## 2024-10-21 SDOH — SOCIAL STABILITY: SOCIAL INSECURITY
WITHIN THE LAST YEAR, HAVE YOU BEEN RAPED OR FORCED TO HAVE ANY KIND OF SEXUAL ACTIVITY BY YOUR PARTNER OR EX-PARTNER?: NO

## 2024-10-21 SDOH — SOCIAL STABILITY: SOCIAL INSECURITY: HAS ANYONE EVER THREATENED TO HURT YOUR FAMILY OR YOUR PETS?: NO

## 2024-10-21 SDOH — SOCIAL STABILITY: SOCIAL INSECURITY: HAVE YOU HAD THOUGHTS OF HARMING ANYONE ELSE?: NO

## 2024-10-21 SDOH — ECONOMIC STABILITY: FOOD INSECURITY: WITHIN THE PAST 12 MONTHS, THE FOOD YOU BOUGHT JUST DIDN'T LAST AND YOU DIDN'T HAVE MONEY TO GET MORE.: NEVER TRUE

## 2024-10-21 SDOH — ECONOMIC STABILITY: INCOME INSECURITY: IN THE PAST 12 MONTHS HAS THE ELECTRIC, GAS, OIL, OR WATER COMPANY THREATENED TO SHUT OFF SERVICES IN YOUR HOME?: NO

## 2024-10-21 SDOH — SOCIAL STABILITY: SOCIAL INSECURITY: WITHIN THE LAST YEAR, HAVE YOU BEEN AFRAID OF YOUR PARTNER OR EX-PARTNER?: NO

## 2024-10-21 SDOH — SOCIAL STABILITY: SOCIAL INSECURITY
WITHIN THE LAST YEAR, HAVE YOU BEEN KICKED, HIT, SLAPPED, OR OTHERWISE PHYSICALLY HURT BY YOUR PARTNER OR EX-PARTNER?: NO

## 2024-10-21 SDOH — SOCIAL STABILITY: SOCIAL INSECURITY: WITHIN THE LAST YEAR, HAVE YOU BEEN HUMILIATED OR EMOTIONALLY ABUSED IN OTHER WAYS BY YOUR PARTNER OR EX-PARTNER?: NO

## 2024-10-21 SDOH — SOCIAL STABILITY: SOCIAL INSECURITY: ARE YOU OR HAVE YOU BEEN THREATENED OR ABUSED PHYSICALLY, EMOTIONALLY, OR SEXUALLY BY ANYONE?: NO

## 2024-10-21 SDOH — SOCIAL STABILITY: SOCIAL INSECURITY: ABUSE: ADULT

## 2024-10-21 SDOH — SOCIAL STABILITY: SOCIAL INSECURITY: ARE THERE ANY APPARENT SIGNS OF INJURIES/BEHAVIORS THAT COULD BE RELATED TO ABUSE/NEGLECT?: NO

## 2024-10-21 SDOH — ECONOMIC STABILITY: FOOD INSECURITY: WITHIN THE PAST 12 MONTHS, YOU WORRIED THAT YOUR FOOD WOULD RUN OUT BEFORE YOU GOT THE MONEY TO BUY MORE.: NEVER TRUE

## 2024-10-21 SDOH — SOCIAL STABILITY: SOCIAL INSECURITY: WERE YOU ABLE TO COMPLETE ALL THE BEHAVIORAL HEALTH SCREENINGS?: YES

## 2024-10-21 SDOH — SOCIAL STABILITY: SOCIAL INSECURITY: DOES ANYONE TRY TO KEEP YOU FROM HAVING/CONTACTING OTHER FRIENDS OR DOING THINGS OUTSIDE YOUR HOME?: NO

## 2024-10-21 SDOH — SOCIAL STABILITY: SOCIAL INSECURITY: DO YOU FEEL ANYONE HAS EXPLOITED OR TAKEN ADVANTAGE OF YOU FINANCIALLY OR OF YOUR PERSONAL PROPERTY?: NO

## 2024-10-21 ASSESSMENT — ACTIVITIES OF DAILY LIVING (ADL)
LACK_OF_TRANSPORTATION: NO
HEARING - LEFT EAR: FUNCTIONAL
JUDGMENT_ADEQUATE_SAFELY_COMPLETE_DAILY_ACTIVITIES: YES
ASSISTIVE_DEVICE: WALKER
DRESSING YOURSELF: NEEDS ASSISTANCE
GROOMING: NEEDS ASSISTANCE
TOILETING: NEEDS ASSISTANCE
ADEQUATE_TO_COMPLETE_ADL: NO
PATIENT'S MEMORY ADEQUATE TO SAFELY COMPLETE DAILY ACTIVITIES?: YES
FEEDING YOURSELF: NEEDS ASSISTANCE
WALKS IN HOME: NEEDS ASSISTANCE
BATHING: NEEDS ASSISTANCE
HEARING - RIGHT EAR: FUNCTIONAL
LACK_OF_TRANSPORTATION: NO

## 2024-10-21 ASSESSMENT — COGNITIVE AND FUNCTIONAL STATUS - GENERAL
HELP NEEDED FOR BATHING: A LITTLE
TURNING FROM BACK TO SIDE WHILE IN FLAT BAD: A LITTLE
DAILY ACTIVITIY SCORE: 19
WALKING IN HOSPITAL ROOM: A LOT
PERSONAL GROOMING: A LITTLE
CLIMB 3 TO 5 STEPS WITH RAILING: TOTAL
STANDING UP FROM CHAIR USING ARMS: A LOT
TURNING FROM BACK TO SIDE WHILE IN FLAT BAD: A LITTLE
MOVING FROM LYING ON BACK TO SITTING ON SIDE OF FLAT BED WITH BEDRAILS: A LITTLE
CLIMB 3 TO 5 STEPS WITH RAILING: TOTAL
PATIENT BASELINE BEDBOUND: NO
MOVING TO AND FROM BED TO CHAIR: A LITTLE
WALKING IN HOSPITAL ROOM: A LOT
DAILY ACTIVITIY SCORE: 19
MOBILITY SCORE: 14
MOBILITY SCORE: 14
DRESSING REGULAR UPPER BODY CLOTHING: A LITTLE
HELP NEEDED FOR BATHING: A LITTLE
PERSONAL GROOMING: A LITTLE
DRESSING REGULAR LOWER BODY CLOTHING: A LITTLE
MOVING FROM LYING ON BACK TO SITTING ON SIDE OF FLAT BED WITH BEDRAILS: A LITTLE
TOILETING: A LITTLE
DRESSING REGULAR LOWER BODY CLOTHING: A LITTLE
TOILETING: A LITTLE
STANDING UP FROM CHAIR USING ARMS: A LOT
MOVING TO AND FROM BED TO CHAIR: A LITTLE
DRESSING REGULAR UPPER BODY CLOTHING: A LITTLE

## 2024-10-21 ASSESSMENT — LIFESTYLE VARIABLES
AUDIT-C TOTAL SCORE: 0
AUDIT-C TOTAL SCORE: 0
SKIP TO QUESTIONS 9-10: 1
HOW OFTEN DO YOU HAVE A DRINK CONTAINING ALCOHOL: NEVER
HOW MANY STANDARD DRINKS CONTAINING ALCOHOL DO YOU HAVE ON A TYPICAL DAY: PATIENT DOES NOT DRINK
HOW OFTEN DO YOU HAVE 6 OR MORE DRINKS ON ONE OCCASION: NEVER

## 2024-10-21 ASSESSMENT — PAIN - FUNCTIONAL ASSESSMENT
PAIN_FUNCTIONAL_ASSESSMENT: 0-10

## 2024-10-21 ASSESSMENT — PAIN SCALES - GENERAL
PAINLEVEL_OUTOF10: 7
PAINLEVEL_OUTOF10: 2
PAINLEVEL_OUTOF10: 6
PAINLEVEL_OUTOF10: 2
PAINLEVEL_OUTOF10: 5 - MODERATE PAIN
PAINLEVEL_OUTOF10: 7
PAINLEVEL_OUTOF10: 9

## 2024-10-21 ASSESSMENT — PAIN DESCRIPTION - DESCRIPTORS: DESCRIPTORS: ACHING

## 2024-10-21 ASSESSMENT — PATIENT HEALTH QUESTIONNAIRE - PHQ9
1. LITTLE INTEREST OR PLEASURE IN DOING THINGS: NOT AT ALL
SUM OF ALL RESPONSES TO PHQ9 QUESTIONS 1 & 2: 0
2. FEELING DOWN, DEPRESSED OR HOPELESS: NOT AT ALL

## 2024-10-21 ASSESSMENT — PAIN DESCRIPTION - PAIN TYPE: TYPE: ACUTE PAIN

## 2024-10-21 ASSESSMENT — PAIN DESCRIPTION - LOCATION
LOCATION: HIP
LOCATION: HIP

## 2024-10-21 ASSESSMENT — PAIN DESCRIPTION - ORIENTATION
ORIENTATION: LEFT
ORIENTATION: LEFT

## 2024-10-21 NOTE — PROGRESS NOTES
10/21/24 1225   Discharge Planning   Living Arrangements Family members  (states lives with 90 yr old uncle)   Support Systems Family members;Children   Assistance Needed none, Pt states PTA independent with rollator walker or cane, Ind ADLS and shares IADLS with uncle, drives, fall PTA- states knees gave out, 1 other fall last week when fell between bed and table and has wound on arm. Pt uses [employer]- unsure of supplier, and states active with [facility]. Pt owns rollator, cane, walk-in shower with seat and grab bar, and grab bar near toilet   Type of Residence Private residence   Home or Post Acute Services In home services;Post acute facilities (Rehab/SNF/etc)   Type of Post Acute Facility Services Rehab;Skilled nursing   Expected Discharge Disposition SNF   Does the patient need discharge transport arranged? Yes   RoundTrip coordination needed? Yes   Has discharge transport been arranged? No   Financial Resource Strain   How hard is it for you to pay for the very basics like food, housing, medical care, and heating? Not hard   Housing Stability   In the last 12 months, was there a time when you were not able to pay the mortgage or rent on time? N   In the past 12 months, how many times have you moved where you were living? 0   At any time in the past 12 months, were you homeless or living in a shelter (including now)? N   Transportation Needs   In the past 12 months, has lack of transportation kept you from medical appointments or from getting medications? no   In the past 12 months, has lack of transportation kept you from meetings, work, or from getting things needed for daily living? No     Pt admitted after fall at home with DX closed traumatic nondisplaced fracture of neck of left femur, pt to go to OR for ORIF vs Hemiarthroplasty once medically cleared and INR acceptable level. Pt takes Coumadin and INR 2.3. Pt states her DC preference is home, that her daughter is a nurse, states she is  active with [facility]. CT team will monitor case for progression and follow up for post op PT/OT eval AMPAC scores to aide in discharge planning.

## 2024-10-21 NOTE — PROGRESS NOTES
[facility] Critical Care Medicine       Date:  10/21/2024  Patient:  Maria Guadalupe Marks  YOB: 1950  MRN:  [id number]   Admit Date:  10/20/2024  ========================================================================================================    Chief Complaint   Patient presents with    Fall     Fall  with left hip pain +coumadin, no head injury, no LOC         History of Present Illness:  Maria Guadalupe Marks is a 74 y.o. year old female patient with Past Medical History of Afib on Warfarin, Hx of DVT, Hypertension, lumbar radiculopathy, hyperlipidemia, CKD 3a, hypercoagulable state secondary to factor V Leiden mutation, COPD was chronic respiratory failure on home oxygen 2 L/min.  Initially presented to the ED status post fall patient reported that she was getting out of her bed and fell on her left side striking her left knee and left hip.  She denies hitting her head or losing consciousness.  Patient also reported wheezing, nonproductive cough and wears 2 L nasal cannula at baseline at home.    ER course: Patient was awake, alert, oriented, no acute distress.  She was hemodynamically stable.  She was maintaining saturation well on 2 L/min her baseline.  However she does have some wheezing on auscultation and has some cough.  Her chest x-ray reported small right pleural effusion, and has a chronic interstitial coarsening suggestive of COPD/emphysema  Her labs shows mild hyperkalemia 5.4, no leukocytosis, and creatinine was 1.14 similar to baseline.  Her INR was 2.4.  Hemoglobin level was 9.5 from baseline 10.3.  Trauma imaging's did confirm acute mildly impacted left transcervical femoral neck fracture. Case discussed with orthopedic surgery, recommended holding warfarin, and possible surgery tomorrow if INR is acceptable.  Given vitamin K.    While under medicine service patient developed worsening acute on chronic hypercapnic respiratory failure with hypoxia.  She was placed on BiPAP, initial ABG shows  pH of 7.30, pCO2 of 89, PaO2 76 HCO3 43.8.  Patient is lethargic and confused.  Ultimately admitted to ICU for acute on chronic hypercapnic respiratory failure with hypoxia.    Interval ICU Events:  10/21: Admitted to ICU for acute on chronic hypercapnic respiratory failure with hypoxia.  Pending repeat ABG, pending repeat chest x-ray.    Medical History:  Past Medical History:   Diagnosis Date    COPD (chronic obstructive pulmonary disease) (Multi)      History reviewed. No pertinent surgical history.  Medications Prior to Admission   Medication Sig Dispense Refill Last Dose/Taking    acetaminophen (Tylenol 8 HOUR) 650 mg ER tablet Take 1 tablet (650 mg) by mouth if needed.   10/20/2024    albuterol 90 mcg/actuation inhaler Inhale 2 puffs every 4 hours if needed.   10/20/2024    DULoxetine (Cymbalta) 60 mg DR capsule Take 1 capsule (60 mg) by mouth once daily. Do not crush or chew.   10/20/2024    ipratropium-albuteroL (Duo-Neb) 0.5-2.5 mg/3 mL nebulizer solution Inhale 3 mL 3 times a day.   10/20/2024    lovastatin (Mevacor) 40 mg tablet Take 1 tablet (40 mg) by mouth once daily at bedtime.   10/20/2024    magnesium oxide (Mag-Ox) 400 mg tablet Take 1.25 tablets (500 mg) by mouth once daily.   10/20/2024    metOLazone (Zaroxolyn) 2.5 mg tablet Take 1 tablet (2.5 mg) by mouth.   10/20/2024    montelukast (Singulair) 10 mg tablet Take 1 tablet (10 mg) by mouth once daily at bedtime.   10/20/2024    pramipexole (Mirapex) 0.5 mg tablet TAKE 1 TABLET BY MOUTH FOUR TIMES DAILY AS NEEDED (RESTLESS LEG).   10/20/2024    torsemide (Demadex) 20 mg tablet 1-2  tablets daily for leg edema   10/20/2024    warfarin (Coumadin) 5 mg tablet Take 7.5 mg on MonWed and 5 mg all other days of the week or as directed by [facility].   10/20/2024    amitriptyline (Elavil) 75 mg tablet Take by mouth. (Patient not taking: Reported on 10/21/2024)   Not Taking    calcium carbonate-vitamin D3 500 mg-5 mcg (200 unit) tablet Take 1 tablet  by mouth once daily.       carvedilol (Coreg) 6.25 mg tablet Take 1 tablet (6.25 mg) by mouth every 12 hours. (Patient not taking: Reported on 10/21/2024)   Not Taking    losartan (Cozaar) 50 mg tablet Take by mouth. (Patient not taking: Reported on 10/21/2024)   Not Taking    neomycin-polymyxin B-dexameth (Polydex) 3.5 mg/g-10,000 unit/g-0.1 % ointment ophthalmic ointment Apply to right eye 4 times a day. Apply 1/4 inch ribbon to right eye lid 4 times a day and at bed       nystatin (Mycostatin) 100,000 unit/gram powder Apply 1 Application topically if needed for rash.       pregabalin (Lyrica) 75 mg capsule Take 1 capsule (75 mg) by mouth 3 times a day. (Patient not taking: Reported on 10/21/2024)   Not Taking    warfarin (Coumadin) 2.5 mg tablet Take by mouth.        Morphine and Adhesive tape-silicones  Social History     Tobacco Use    Smoking status: Former     Types: Cigarettes    Smokeless tobacco: Never     No family history on file.    Review of Systems:  14 point review of systems was completed and negative except for those specially mention in my HPI    Physical Exam:    Heart Rate:  []   Temp:  [36.1 °C (97 °F)-36.8 °C (98.2 °F)]   Resp:  [14-24]   BP: (112-156)/(51-78)   Height:  [152.4 cm (5')]   Weight:  [68 kg (150 lb)-73.2 kg (161 lb 6 oz)]   SpO2:  [93 %-98 %]     Physical Exam  Constitutional:       General: She is awake.      Appearance: Normal appearance.   HENT:      Head: Normocephalic and atraumatic.      Mouth/Throat:      Mouth: Mucous membranes are dry.   Cardiovascular:      Rate and Rhythm: Normal rate. Rhythm irregular.      Pulses: Normal pulses.      Heart sounds: Normal heart sounds.   Pulmonary:      Effort: Tachypnea and respiratory distress present.      Breath sounds: Examination of the right-upper field reveals wheezing. Examination of the left-upper field reveals wheezing. Examination of the right-middle field reveals wheezing. Examination of the left-middle field  reveals wheezing. Examination of the right-lower field reveals wheezing. Examination of the left-lower field reveals wheezing. Wheezing present.   Abdominal:      General: Abdomen is flat. Bowel sounds are normal.      Palpations: Abdomen is soft.   Musculoskeletal:      Cervical back: Full passive range of motion without pain.      Right lower leg: No edema.      Left lower leg: No edema.      Comments: Tender to palpation left hip, with limited range of motion due to pain.    Neurological:      Mental Status: She is lethargic and confused.         Objective:    ECG 12 Lead    Result Date: 10/21/2024  Atrial fibrillation with premature ventricular or aberrantly conducted complexes Rightward axis ST & T wave abnormality, consider lateral ischemia Abnormal ECG When compared with ECG of 14-APR-2022 19:29, Atrial fibrillation has replaced Sinus rhythm Questionable change in QRS axis T wave inversion now evident in Anterior leads    XR shoulder left 2+ views    Result Date: 10/21/2024  Interpreted By:  Carola Oliva, STUDY: XR SHOULDER LEFT 2+ VIEWS; ;  10/20/2024 11:52 pm   INDICATION: Signs/Symptoms:fall.   COMPARISON: None.   ACCESSION NUMBER(S): [id number]   ORDERING CLINICIAN: YAKELIN MOROCHO   FINDINGS: Two views left shoulder. No acute fracture or malalignment. Left glenohumeral osteoarthrosis noted. Several chronic left rib fractures are noted. The left lung is clear       No acute fracture or malalignment.   Glenohumeral osteoarthrosis.     MACRO: None   Signed by: Carola Oliva 10/21/2024 12:43 AM Dictation workstation:   [id number]    XR pelvis 1-2 views    Result Date: 10/21/2024  Interpreted By:  Carola Oliva, STUDY: XR PELVIS 1-2 VIEWS; XR FEMUR LEFT 2+ VIEWS; ;  10/20/2024 11:52 pm   INDICATION: Signs/Symptoms:fall.   COMPARISON: None.   ACCESSION NUMBER(S): [id number]; [id number]   ORDERING CLINICIAN: YAKELIN MOROCHO   FINDINGS: Single view pelvis and two views left femur. There is an acute, mildly  impacted transcervical femoral neck fracture. Mild bilateral hip osteoarthrosis. Chronic left pubic rami and body fractures.       Acute, mildly impacted left transcervical femoral neck fracture.     MACRO: None   Signed by: Carola Oliva 10/21/2024 12:42 AM Dictation workstation:   [id number]    XR femur left 2+ views    Result Date: 10/21/2024  Interpreted By:  Carola Oliva, STUDY: XR PELVIS 1-2 VIEWS; XR FEMUR LEFT 2+ VIEWS; ;  10/20/2024 11:52 pm   INDICATION: Signs/Symptoms:fall.   COMPARISON: None.   ACCESSION NUMBER(S): [id number]; [id number]   ORDERING CLINICIAN: YAKELIN MOROCHO   FINDINGS: Single view pelvis and two views left femur. There is an acute, mildly impacted transcervical femoral neck fracture. Mild bilateral hip osteoarthrosis. Chronic left pubic rami and body fractures.       Acute, mildly impacted left transcervical femoral neck fracture.     MACRO: None   Signed by: Carola Oliva 10/21/2024 12:42 AM Dictation workstation:   [id number]    XR chest 1 view    Result Date: 10/21/2024  Interpreted By:  Carola Oliva, STUDY: XR CHEST 1 VIEW;  10/20/2024 11:52 pm   INDICATION: Signs/Symptoms:fall.   COMPARISON: 04/14/2022   ACCESSION NUMBER(S): [id number]   ORDERING CLINICIAN: YAKELIN MOROCHO   FINDINGS:     CARDIOMEDIASTINAL SILHOUETTE: Stable cardiomegaly.   LUNGS: Chronic interstitial coarsening suggesting COPD/emphysema. There is blunting of the right costophrenic angle. No pulmonary consolidation or pneumothorax.   ABDOMEN: No remarkable upper abdominal findings.   BONES: No acute osseous abnormality.       Small right pleural effusion or pleural thickening.   Chronic interstitial coarsening suggesting COPD/emphysema.   MACRO: None   Signed by: Carola Oliva 10/21/2024 12:40 AM Dictation workstation:   [id number]      Assessment/Plan:    I am currently managing this critically ill patient for the following problems:    Neuro/Psych/Pain Ctrl/Sedation:  Metabolic Encephalopathy 2/2 CO2  narcosis  Hx Lumbar Radiculopathy  See respiratory for BIPAP and repeat ABG  CAM ICU  Delirium precautions  Continue home cymbalta     Respiratory/ENT:  Acute on chronic hypercapnic respiratory failure  COPD in exacerbation  Continue BIPAP, currently on 15/8, wears 2L NC at baseline  Keep SPO2 greater than 90%  Initial PH 7.30, PCO2 89, PO2 76- pending repeat ABG on BIPAP   Pending repeat CXR   Q 4 hour Duonebs  40 mg IV solumedrol BID      Cardiovascular:  Hx HTN  Hx Afib on Warfarin  Hx DVT  Holding Warfarin in setting of L femur repair- received Vitamin K for reversal   Currently afib with controlled rates  Continue home carvedilol, home torsemide   Daily EKG    GI:  NPO if optimized may require surgical intervention for left femur fx- see orthopedic recommendations     Renal/Volume Status (Intra & Extravascular):  Hx CKD 3  Baseline Scr 1.10  Current Scr .98, will monitor RFP and avoid nephrotoxic medications  Replete electrolytes as indicated  Daily CMP      Endocrine  No acute issues  Monitor for need for SSI, defer at this time    Infectious Disease:  No acute issues  Daily CBC  Monitor for SIRS    Heme/Onc:  Hx Factor V Leyden   Initial INR 2.4, given Vitamin K current INR 2.0  Cont Daily Coags   Transfuse if HGB less than 7  Daily CBC      OBGYN/MSK:  Left Femur Fx s/p Fall  Requiring surgical intervention, possibly tomorrow 10/22 pending INR and respiratory status  Optimize pain control   Orthopedics consulted- appreciate recommendations     Ethics/Code Status:  Full Code     :  DVT Prophylaxis: holding at this time   GI Prophylaxis: home PPI  Bowel Regimen: Miralax   Diet: NPO  CVC: none  Nikky: none  Dill: none  Restraints: none  Dispo: ICU    Critical Care Time:  40 min    Plan Discussed with Dr. Kamar Rey APRN, CNP  Critical Care Medicine   [facility]

## 2024-10-21 NOTE — CONSULTS
Consult for wound to left leg and skin tear to right arm. Pt has a daughter who has been taking care of her wounds at home, her daughter is a nurse. Dressing to right lateral leg- just at pt's knee removed. There is a small area approx 1cm with irregular border, with dressing in place. Removed dressing and cleaned area with NS. Wound bed is moist, scant amount of drainage present. Applied Aquacel Ag and Mepilex dressing. Pt also has small area on the front of her leg with a bandaid on it, removed bandaid. Applied small piece of Aquacel Ag and applied thin Duoderm dressing to area. Skin tear to right arm- Xeroform and DSD, clean with NS. Apply DSD and Kerlix.   Wound Care Consult     Visit Date: 10/21/2024      Patient Name: Maria Guadalupe Marks         MRN: [id number]           YOB: 1950     Reason for Consult:         Wound History:      Pertinent Labs:   Albumin   Date Value Ref Range Status   10/21/2024 3.2 (L) 3.4 - 5.0 g/dL Final       Wound Assessment:  Wound 10/21/24 Skin Tear Arm Dorsal;Lower;Proximal;Right (Active)   Dressing Xeroform;Kerlix/rolled gauze 10/21/24 0300   Dressing Status Clean;Dry 10/21/24 0300       Wound 10/21/24  Pretibial Right (Active)   Dressing Status Clean;Dry 10/21/24 0300       Wound Team Summary Assessment:      Wound Team Plan:      Marlen Pimentel RN  10/21/2024  1:08 PM

## 2024-10-21 NOTE — PROGRESS NOTES
PROGRESS NOTE    ASSESSMENT AND PLAN:   # Closed nondisplaced left femur fracture  -Seen by orthopedic surgery, plan for surgical intervention once patient is hemodynamically stable.    # Acute on chronic hypercapnic respiratory failure  # COPD exacerbation  -This morning, patient was lethargic but arousable.  -ABG shows a pH of 7.3, PC02 89  -Will start BiPAP for couple of hours and reassess  -Start Solu-Medrol IV twice daily, continue nebulizer treatments.    # History of factor V Leiden deficiency and prothrombin gene mutation  # History of pulmonary embolism  -Currently remains on Coumadin with INR 2.3, plan to recheck in the afternoon, if below 2 we will start IV heparin.  -Spoke with orthopedic surgery, plan to hold heparin 8 hours prior to surgery.    # CKD stage III  -Creatinine around baseline, continue to monitor    # Prediabetes  -Most recent hemoglobin A1c 6.0  -Given that patient has been started on Solu-Medrol, will start low-dose sliding scale.    # Macrocytic anemia  -Obtain vitamin B12 level    SUBJECTIVE:   Admit Date: 10/20/2024    Interval History: She feels okay, complains of hip pain otherwise no active complaints.  Continues to fall asleep during conversation.      OBJECTIVE:   Vitals: /70   Pulse 102   Temp 36.1 °C (97 °F)   Resp 24   Ht 1.524 m (5')   Wt 73.2 kg (161 lb 6 oz)   SpO2 94%   BMI 31.52 kg/m²    Wt Readings from Last 3 Encounters:   10/21/24 73.2 kg (161 lb 6 oz)      24HR INTAKE/OUTPUT:  No intake or output data in the 24 hours ending 10/21/24 1305    PHYSICAL EXAM:   GENERAL: Laying in bed, does not appear to be in any distress.   HEENT: HEAD: Normocephalic atraumatic.  Neck: Supple.  Eyes: Pupils are reactive to direct light.   CVS: S1, S2 heard. Regular rate and rhythm  LUNGS: Coarse breath sounds appreciated.  ABDOMEN: Soft, nontender to palpate. Positive bowel sounds. No guarding or rebound appreciated.  NEUROLOGICAL: No focal neurological deficits appreciated.  "Cranial nerves are grossly intact.  EXTREMITIES: No edema appreciated.  SKIN:  Grossly intact, warm and dry.      LABS/IMAGING AND MEDICATIONS:   Scheduled Meds:ALPRAZolam, 0.5 mg, oral, Once  carvedilol, 6.25 mg, oral, BID  DULoxetine, 60 mg, oral, Daily  methylPREDNISolone sodium succinate (PF), 40 mg, intravenous, q8h  oxygen, , inhalation, Continuous - Inhalation  pantoprazole, 40 mg, oral, Daily   Or  pantoprazole, 40 mg, intravenous, Daily  polyethylene glycol, 17 g, oral, Daily  pregabalin, 75 mg, oral, BID  torsemide, 20 mg, oral, Daily      PRN Meds:PRN medications: acetaminophen **OR** acetaminophen **OR** acetaminophen, HYDROcodone-acetaminophen, ipratropium-albuteroL, melatonin, ondansetron **OR** ondansetron    No lab exists for component: \"CBC\"   No lab exists for component: \"CMP\"   No lab exists for component: \"TROPONIN\"      Results from last 7 days   Lab Units 10/21/24  0543   INR  2.3*     No lab exists for component: \"LIPIDS\"       No lab exists for component: \"URINALYSIS\"          BMP:  Results from last 7 days   Lab Units 10/21/24  0543 10/21/24  0120   SODIUM mmol/L 143 143   POTASSIUM mmol/L 5.3 5.4*   CHLORIDE mmol/L 102 102   CO2 mmol/L 40* 40*   BUN mg/dL 26* 26*   CREATININE mg/dL 0.98 1.14*       CBC:  Results from last 7 days   Lab Units 10/21/24  0543 10/21/24  0120   WBC AUTO x10*3/uL 7.8 6.1   RBC AUTO x10*6/uL 3.47* 3.07*   HEMOGLOBIN g/dL 10.6* 9.5*   HEMATOCRIT % 35.9* 32.2*   MCV fL 104* 105*   MCH pg 30.5 30.9   MCHC g/dL 29.5* 29.5*   RDW % 14.3 14.3   PLATELETS AUTO x10*3/uL 272 226       Cardiac Enzymes:           Hepatic Function Panel:  Results from last 7 days   Lab Units 10/21/24  0120   ALK PHOS U/L 100   ALT U/L 16   AST U/L 15   PROTEIN TOTAL g/dL 5.8*   BILIRUBIN TOTAL mg/dL 0.2       Magnesium:  Results from last 7 days   Lab Units 10/21/24  0120   MAGNESIUM mg/dL 1.83       Pro-BNP:  No results found for: \"PROBNP\"    INR:  Results from last 7 days   Lab Units " "10/21/24  0543 10/21/24  0120   PROTIME seconds 26.5* 27.6*   INR  2.3* 2.4*       TSH:  No results found for: \"TSH\"    Lipid Profile:        No lab exists for component: \"LABVLDL\"    HgbA1C:        Lactate Level:  No lab exists for component: \"LACTA\"    CMP:  Results from last 7 days   Lab Units 10/21/24  0543 10/21/24  0120   SODIUM mmol/L 143 143   POTASSIUM mmol/L 5.3 5.4*   CHLORIDE mmol/L 102 102   CO2 mmol/L 40* 40*   BUN mg/dL 26* 26*   CREATININE mg/dL 0.98 1.14*   GLUCOSE mg/dL 112* 114*   CALCIUM mg/dL 8.7 8.7   PROTEIN TOTAL g/dL  --  5.8*   BILIRUBIN TOTAL mg/dL  --  0.2   ALK PHOS U/L  --  100   AST U/L  --  15   ALT U/L  --  16       Amylase:        Lipase:        ABG:        No lab exists for component: \"PO2\", \"PCO2\", \"HCO3\", \"BE\", \"O2SAT\"       "

## 2024-10-21 NOTE — CARE PLAN
Problem: Pain - Adult  Goal: Verbalizes/displays adequate comfort level or baseline comfort level  Outcome: Progressing     Problem: Safety - Adult  Goal: Free from fall injury  Outcome: Progressing     Problem: Discharge Planning  Goal: Discharge to home or other facility with appropriate resources  Outcome: Progressing     Problem: Chronic Conditions and Co-morbidities  Goal: Patient's chronic conditions and co-morbidity symptoms are monitored and maintained or improved  Outcome: Progressing     Problem: Skin  Goal: Decreased wound size/increased tissue granulation at next dressing change  Outcome: Progressing  Goal: Participates in plan/prevention/treatment measures  Outcome: Progressing  Goal: Prevent/manage excess moisture  Outcome: Progressing  Goal: Prevent/minimize sheer/friction injuries  Outcome: Progressing  Goal: Promote/optimize nutrition  Outcome: Progressing  Goal: Promote skin healing  Outcome: Progressing     Problem: Pain  Goal: Takes deep breaths with improved pain control throughout the shift  Outcome: Progressing  Goal: Turns in bed with improved pain control throughout the shift  Outcome: Progressing  Goal: Walks with improved pain control throughout the shift  Outcome: Progressing  Goal: Performs ADL's with improved pain control throughout shift  Outcome: Progressing  Goal: Participates in PT with improved pain control throughout the shift  Outcome: Progressing  Goal: Free from opioid side effects throughout the shift  Outcome: Progressing  Goal: Free from acute confusion related to pain meds throughout the shift  Outcome: Progressing   The patient's goals for the shift include      The clinical goals for the shift include Pain management

## 2024-10-21 NOTE — H&P
History Of Present Illness  Maria Guadalupe Marks is a 74 y.o. female presenting with a fall.  She reported was getting out of bed and fell on her left side striking her left knee and left hip.  She denied hitting her head or losing consciousness.  She has pain in the left hip.  Patient is on warfarin for history of DVT.  Patient also reported wheezing, some cough, and has history of COPD with chronic respiratory failure on 2 L/min home oxygen..   She denies fever, vomiting, diarrhea    ER course: Patient was awake, alert, oriented, no acute distress.  She was hemodynamically stable.  She was maintaining saturation well on 2 L/min her baseline.  However she does have some wheezing on auscultation and has some cough.  Her chest x-ray reported small right pleural effusion, and has a chronic interstitial coarsening suggestive of COPD/emphysema  Her labs shows mild hyperkalemia 5.4, no leukocytosis, and creatinine was 1.14 similar to baseline.  Her INR was 2.4.  Hemoglobin level was 9.5 from baseline 10.3.  Trauma imaging's did confirm acute mildly impacted left transcervical femoral neck fracture.  Case discussed with orthopedic surgery, recommended holding warfarin, and possible surgery tomorrow if INR is acceptable.     Past Medical History  A-fib  History of DVT  Hypertension  COPD with chronic respiratory failure on home oxygen 2 L/min  Lumbar radiculopathy  Hyperlipidemia  Hypercoagulable state  Factor V Leyden mutation    Surgical History  She has no past surgical history on file.     Social History  She has no history on file for tobacco use, alcohol use, and drug use.    Family History  No family history on file.     Allergies  Morphine    Review of Systems  10/10 points review of system were conducted, negative except as above    Physical Exam  Constitutional:       Appearance: She is not ill-appearing or diaphoretic.   HENT:      Head: Normocephalic and atraumatic.      Nose: Nose normal.      Mouth/Throat:       Mouth: Mucous membranes are moist.   Eyes:      Extraocular Movements: Extraocular movements intact.      Conjunctiva/sclera: Conjunctivae normal.      Pupils: Pupils are equal, round, and reactive to light.   Neck:      Vascular: No carotid bruit.   Cardiovascular:      Rate and Rhythm: Normal rate. Rhythm irregular.      Heart sounds: No murmur heard.  Pulmonary:      Breath sounds: Wheezing, rhonchi and rales present.   Abdominal:      General: There is no distension.      Palpations: There is no mass.      Tenderness: There is no abdominal tenderness. There is no right CVA tenderness, left CVA tenderness, guarding or rebound.      Hernia: No hernia is present.   Musculoskeletal:      Cervical back: Normal range of motion and neck supple. No tenderness.      Right lower leg: Edema present.      Left lower leg: Edema present.      Comments: Tender to palpation left hip, with limited range of motion due to pain.   Skin:     General: Skin is warm and dry.      Coloration: Skin is not jaundiced or pale.      Findings: No rash.   Neurological:      General: No focal deficit present.      Mental Status: She is alert and oriented to person, place, and time. Mental status is at baseline.   Psychiatric:         Mood and Affect: Mood normal.         Behavior: Behavior normal.          Last Recorded Vitals  Blood pressure 112/71, pulse 86, temperature 36.8 °C (98.2 °F), temperature source Temporal, resp. rate (!) 21, height 1.524 m (5'), weight 68 kg (150 lb), SpO2 (!) 93%.    Relevant Results  Scheduled medications  carvedilol, 6.25 mg, oral, BID  pantoprazole, 40 mg, oral, Daily   Or  pantoprazole, 40 mg, intravenous, Daily  polyethylene glycol, 17 g, oral, Daily  pregabalin, 75 mg, oral, BID  torsemide, 20 mg, oral, Daily      Continuous medications     PRN medications  PRN medications: acetaminophen **OR** acetaminophen **OR** acetaminophen, HYDROcodone-acetaminophen, ipratropium-albuteroL, melatonin, ondansetron **OR**  ondansetron     Results for orders placed or performed during the hospital encounter of 10/20/24 (from the past 24 hours)   CBC and Auto Differential   Result Value Ref Range    WBC 6.1 4.4 - 11.3 x10*3/uL    nRBC 0.0 0.0 - 0.0 /100 WBCs    RBC 3.07 (L) 4.00 - 5.20 x10*6/uL    Hemoglobin 9.5 (L) 12.0 - 16.0 g/dL    Hematocrit 32.2 (L) 36.0 - 46.0 %     (H) 80 - 100 fL    MCH 30.9 26.0 - 34.0 pg    MCHC 29.5 (L) 32.0 - 36.0 g/dL    RDW 14.3 11.5 - 14.5 %    Platelets 226 150 - 450 x10*3/uL    Neutrophils % 73.7 40.0 - 80.0 %    Immature Granulocytes %, Automated 0.8 0.0 - 0.9 %    Lymphocytes % 12.4 13.0 - 44.0 %    Monocytes % 10.9 2.0 - 10.0 %    Eosinophils % 2.0 0.0 - 6.0 %    Basophils % 0.2 0.0 - 2.0 %    Neutrophils Absolute 4.47 1.60 - 5.50 x10*3/uL    Immature Granulocytes Absolute, Automated 0.05 0.00 - 0.50 x10*3/uL    Lymphocytes Absolute 0.75 (L) 0.80 - 3.00 x10*3/uL    Monocytes Absolute 0.66 0.05 - 0.80 x10*3/uL    Eosinophils Absolute 0.12 0.00 - 0.40 x10*3/uL    Basophils Absolute 0.01 0.00 - 0.10 x10*3/uL   Magnesium   Result Value Ref Range    Magnesium 1.83 1.60 - 2.40 mg/dL   Comprehensive metabolic panel   Result Value Ref Range    Glucose 114 (H) 74 - 99 mg/dL    Sodium 143 136 - 145 mmol/L    Potassium 5.4 (H) 3.5 - 5.3 mmol/L    Chloride 102 98 - 107 mmol/L    Bicarbonate 40 (HH) 21 - 32 mmol/L    Anion Gap <7 (L) 10 - 20 mmol/L    Urea Nitrogen 26 (H) 6 - 23 mg/dL    Creatinine 1.14 (H) 0.50 - 1.05 mg/dL    eGFR 51 (L) >60 mL/min/1.73m*2    Calcium 8.7 8.6 - 10.3 mg/dL    Albumin 3.2 (L) 3.4 - 5.0 g/dL    Alkaline Phosphatase 100 33 - 136 U/L    Total Protein 5.8 (L) 6.4 - 8.2 g/dL    AST 15 9 - 39 U/L    Bilirubin, Total 0.2 0.0 - 1.2 mg/dL    ALT 16 7 - 45 U/L   Type And Screen   Result Value Ref Range    ABO TYPE A     Rh TYPE POS     ANTIBODY SCREEN NEG    Protime-INR   Result Value Ref Range    Protime 27.6 (H) 9.8 - 12.8 seconds    INR 2.4 (H) 0.9 - 1.1   aPTT   Result Value Ref  Range    aPTT 41 (H) 27 - 38 seconds       XR shoulder left 2+ views    Result Date: 10/21/2024  Interpreted By:  Carola Oliva, STUDY: XR SHOULDER LEFT 2+ VIEWS; ;  10/20/2024 11:52 pm   INDICATION: Signs/Symptoms:fall.   COMPARISON: None.   ACCESSION NUMBER(S): [id number]   ORDERING CLINICIAN: YAKELIN MOROCHO   FINDINGS: Two views left shoulder. No acute fracture or malalignment. Left glenohumeral osteoarthrosis noted. Several chronic left rib fractures are noted. The left lung is clear       No acute fracture or malalignment.   Glenohumeral osteoarthrosis.     MACRO: None   Signed by: Carola Oliva 10/21/2024 12:43 AM Dictation workstation:   [id number]    XR pelvis 1-2 views    Result Date: 10/21/2024  Interpreted By:  Carola Oliva, STUDY: XR PELVIS 1-2 VIEWS; XR FEMUR LEFT 2+ VIEWS; ;  10/20/2024 11:52 pm   INDICATION: Signs/Symptoms:fall.   COMPARISON: None.   ACCESSION NUMBER(S): [id number]; [id number]   ORDERING CLINICIAN: YAKELIN MOROCHO   FINDINGS: Single view pelvis and two views left femur. There is an acute, mildly impacted transcervical femoral neck fracture. Mild bilateral hip osteoarthrosis. Chronic left pubic rami and body fractures.       Acute, mildly impacted left transcervical femoral neck fracture.     MACRO: None   Signed by: Carola Oliva 10/21/2024 12:42 AM Dictation workstation:   [id number]    XR femur left 2+ views    Result Date: 10/21/2024  Interpreted By:  Carola Oliva, STUDY: XR PELVIS 1-2 VIEWS; XR FEMUR LEFT 2+ VIEWS; ;  10/20/2024 11:52 pm   INDICATION: Signs/Symptoms:fall.   COMPARISON: None.   ACCESSION NUMBER(S): [id number]; [id number]   ORDERING CLINICIAN: YAKELIN MOROCHO   FINDINGS: Single view pelvis and two views left femur. There is an acute, mildly impacted transcervical femoral neck fracture. Mild bilateral hip osteoarthrosis. Chronic left pubic rami and body fractures.       Acute, mildly impacted left transcervical femoral neck fracture.     MACRO: None    Signed by: Carola Oliva 10/21/2024 12:42 AM Dictation workstation:   [id number]    XR chest 1 view    Result Date: 10/21/2024  Interpreted By:  Carola Oliva, STUDY: XR CHEST 1 VIEW;  10/20/2024 11:52 pm   INDICATION: Signs/Symptoms:fall.   COMPARISON: 04/14/2022   ACCESSION NUMBER(S): [id number]   ORDERING CLINICIAN: YAKELIN MOROCHO   FINDINGS:     CARDIOMEDIASTINAL SILHOUETTE: Stable cardiomegaly.   LUNGS: Chronic interstitial coarsening suggesting COPD/emphysema. There is blunting of the right costophrenic angle. No pulmonary consolidation or pneumothorax.   ABDOMEN: No remarkable upper abdominal findings.   BONES: No acute osseous abnormality.       Small right pleural effusion or pleural thickening.   Chronic interstitial coarsening suggesting COPD/emphysema.   MACRO: None   Signed by: Carola Oliva 10/21/2024 12:40 AM Dictation workstation:   [id number]          Assessment/Plan   Assessment & Plan  Closed traumatic nondisplaced fracture of neck of left femur, initial encounter    Hyperkalemia    Hyperglycemia    Hypercoagulable state (Multi)    Factor V Leiden, prothrombin gene mutation (Multi)    COPD (chronic obstructive pulmonary disease) (Multi)    CKD (chronic kidney disease) stage 3, GFR 30-59 ml/min (Multi)      -DuoNeb for bronchodilation.  -Continue nasal cannula oxygen, titrate as needed.  -Will give 5 mg of vitamin K for INR reversal.  -Patient need to be back on anticoagulation once surgery is completed.  -Hold losartan and oral potassium due to hypokalemia.  -Telemetry monitoring.  -Incentive spirometer.  -Orthopedics consult.  -Optimize pain control.  -Bowel regimen.  -SCD for DVT prophylaxis.    Lobito Bernal MD

## 2024-10-21 NOTE — NURSING NOTE
RRT call for pt  having SOB increased o2 need.  UA found pt A&Ox4 sitting in bed RN Conner states that is wheezing and he needed increase her O2.  Lung sound wheezes and diminished.  RN advised to call respiratory to administer PRN breathing.  Pt repots that she normally has albuterol treatments.  TID.  Recommended to PT RN to message Dr for possible need for scheduled breathing treatments.

## 2024-10-21 NOTE — NURSING NOTE
Pt arriving from 6s into SICU 8 on NRB. Pt lethargic, but SpO2 100%. Pt placed on BiPap by RT upon arrival to SICU. VSS. Pt arousing to light touch. Pt placed on monitor and NP Dr Kamar Cornejo notified of pt's arrival

## 2024-10-21 NOTE — ED PROVIDER NOTES
HPI   Chief Complaint   Patient presents with    Fall     Fall  with left hip pain +coumadin, no head injury, no LOC       Patient presented for evaluation of a fall.  Patient states she was getting out of bed today when she felt her left knee give out.  Patient fell onto her left hip.  Patient denies head or neck injury.  Patient notes she also had a episode where she slipped out of bed onto her left shoulder multiple days ago.  Patient continues to have left shoulder pain.  Patient was not evaluated for the fall.  Patient denies any head or neck injury without fall as well.  Patient normally ambulates with a walker.  Patient does take warfarin.  Patient wears 2 L of oxygen at baseline.      History provided by:  Patient          Patient History   Past Medical History:   Diagnosis Date    COPD (chronic obstructive pulmonary disease) (Multi)      History reviewed. No pertinent surgical history.  No family history on file.  Social History     Tobacco Use    Smoking status: Former     Types: Cigarettes    Smokeless tobacco: Never   Substance Use Topics    Alcohol use: Not on file    Drug use: Not on file       Physical Exam   ED Triage Vitals [10/20/24 2234]   Temperature Heart Rate Respirations BP   36.8 °C (98.2 °F) 87 18 149/53      Pulse Ox Temp Source Heart Rate Source Patient Position   98 % Temporal Monitor Lying      BP Location FiO2 (%)     Right arm --       Physical Exam  Vitals and nursing note reviewed.   Constitutional:       Appearance: Normal appearance.   HENT:      Head: Atraumatic.      Right Ear: External ear normal.      Left Ear: External ear normal.      Nose: Nose normal.      Mouth/Throat:      Mouth: Mucous membranes are moist.   Eyes:      Extraocular Movements: Extraocular movements intact.      Pupils: Pupils are equal, round, and reactive to light.   Cardiovascular:      Rate and Rhythm: Normal rate and regular rhythm.      Pulses: Normal pulses.   Pulmonary:      Effort: Pulmonary effort  is normal.      Breath sounds: Normal breath sounds.   Abdominal:      Palpations: Abdomen is soft.      Tenderness: There is no abdominal tenderness.   Musculoskeletal:         General: No tenderness.      Left shoulder: Bony tenderness present. Decreased range of motion.      Right elbow: No tenderness.      Left elbow: No tenderness.      Right wrist: No tenderness.      Left wrist: No tenderness.      Cervical back: Normal range of motion and neck supple. No rigidity, tenderness or bony tenderness.      Thoracic back: No bony tenderness.      Lumbar back: No bony tenderness.      Right hip: No bony tenderness.      Left hip: Bony tenderness present. Decreased range of motion.      Right upper leg: No tenderness.      Left upper leg: No tenderness.      Right knee: No tenderness.      Left knee: No tenderness.      Right ankle: Swelling present. No tenderness.      Left ankle: Swelling present. No tenderness.   Skin:     General: Skin is warm and dry.   Neurological:      General: No focal deficit present.      Mental Status: She is alert and oriented to person, place, and time. Mental status is at baseline.   Psychiatric:         Mood and Affect: Mood normal.         Behavior: Behavior normal.           ED Course & MDM   ED Course as of 10/21/24 0533   Mon Oct 21, 2024   0111 Updated patient and family with findings concerning for left hip fracture.  Agree with plan of admission. [JA]   0116 Discussed with Dr. Whitten and he recommends keeping her NPO.  [JA]   0125 Discussed with Dr. Bernal and he accepts the patient to his service.  [JA]      ED Course User Index  [JA] Micha Ahmadi DO         Diagnoses as of 10/21/24 0533   Fall, initial encounter   Closed traumatic nondisplaced fracture of neck of left femur, initial encounter                 No data recorded     [place] Coma Scale Score: 15 (10/21/24 0300 : Isidra Jones RN)                           Medical Decision Making  Differential diagnosis:  Left hip fracture, femur fracture, pelvic fracture, shoulder fracture, other    Patient presented for evaluation of a slip and fall onto her left hip.  Patient denies head or neck injury.  Patient does take warfarin for history of blood clots.  Patient found to have a left femoral neck fracture.  Neuro vas intact distally.  Left shoulder negative.  Patient does require oxygen at baseline.  Discussed with orthopedics and recommend keeping the patient n.p.o.  Patient admitted for further evaluation possible surgery        Procedure  Procedures     Micha Ahmadi DO  10/21/24 0540

## 2024-10-21 NOTE — CARE PLAN
The patient's goals for the shift include      The clinical goals for the shift include pt will be free from falls through shift      Problem: Pain - Adult  Goal: Verbalizes/displays adequate comfort level or baseline comfort level  Outcome: Progressing     Problem: Safety - Adult  Goal: Free from fall injury  Outcome: Progressing     Problem: Discharge Planning  Goal: Discharge to home or other facility with appropriate resources  Outcome: Progressing     Problem: Chronic Conditions and Co-morbidities  Goal: Patient's chronic conditions and co-morbidity symptoms are monitored and maintained or improved  Outcome: Progressing     Problem: Skin  Goal: Decreased wound size/increased tissue granulation at next dressing change  Outcome: Progressing  Flowsheets (Taken 10/21/2024 0346)  Decreased wound size/increased tissue granulation at next dressing change: Promote sleep for wound healing  Goal: Participates in plan/prevention/treatment measures  Outcome: Progressing  Flowsheets (Taken 10/21/2024 0346)  Participates in plan/prevention/treatment measures: Elevate heels  Goal: Prevent/manage excess moisture  Outcome: Progressing  Flowsheets (Taken 10/21/2024 0346)  Prevent/manage excess moisture: Cleanse incontinence/protect with barrier cream  Goal: Prevent/minimize sheer/friction injuries  Outcome: Progressing  Flowsheets (Taken 10/21/2024 0346)  Prevent/minimize sheer/friction injuries: Turn/reposition every 2 hours/use positioning/transfer devices  Goal: Promote/optimize nutrition  Outcome: Progressing  Flowsheets (Taken 10/21/2024 0346)  Promote/optimize nutrition: Offer water/supplements/favorite foods  Goal: Promote skin healing  Outcome: Progressing  Flowsheets (Taken 10/21/2024 0346)  Promote skin healing: Turn/reposition every 2 hours/use positioning/transfer devices     Problem: Pain  Goal: Takes deep breaths with improved pain control throughout the shift  Outcome: Progressing  Goal: Turns in bed with improved  pain control throughout the shift  Outcome: Progressing  Goal: Walks with improved pain control throughout the shift  Outcome: Progressing  Goal: Performs ADL's with improved pain control throughout shift  Outcome: Progressing  Goal: Participates in PT with improved pain control throughout the shift  Outcome: Progressing  Goal: Free from opioid side effects throughout the shift  Outcome: Progressing  Goal: Free from acute confusion related to pain meds throughout the shift  Outcome: Progressing

## 2024-10-21 NOTE — CONSULTS
Chief complaint: Left hip pain    History of present illness: Patient fell getting out of bed.  Has pain localized to her left hip.  Denies head pain neck pain loss of consciousness.  There is no fevers chills nausea vomiting.  No bowel or bladder changes.  No history of left hip injury or surgery.  She is on Coumadin.  Her INR equals 2.3.    Past medical history:  Past Medical History:   Diagnosis Date    COPD (chronic obstructive pulmonary disease) (Multi)        Social history:   Social History     Occupational History    Not on file   Tobacco Use    Smoking status: Former     Types: Cigarettes    Smokeless tobacco: Never   Substance and Sexual Activity    Alcohol use: Not on file    Drug use: Not on file    Sexual activity: Not on file        Family history: No family history on file.     Physical exam: Tender in the groin.  Shortened externally rotated left hip.  Hard to check range of motion strength stability left hip due to pain.  Ankle on the left side has pretty good range of motion strength stability nontender.  Comparison examination of the contralateral side is normal with regards to palpation, range of motion, strength and stability.  Well-nourished, well kept.Good perfusion to the lower extremities bilaterally.  Dorsalis pedis pulses 2+.  Capillary refill to the digits brisk.  No distal edema.No lymphangitis or lymphadenopathy in the examined extremities.  No redness, abrasions, or lesions on the lower extremities bilaterally.  Gross sensation intact to the lower extremity is bilaterally.  Affect normal.  HEENT clear, heart regular, abdomin soft, chest clear.    Imaging/special tests: X-rays show left valgus impacted femoral neck fracture.  CT scan is pending which has been ordered by me.    Assessment: Patient with a left femoral neck fracture.  She is on Coumadin.  Currently her levels are prohibitive of surgical intervention.  We will give some vitamin K and possibly FFP to get her INR level down  to an appropriate level for surgery which will likely be tomorrow.  She is severely inhibited with bodily function given her fracture of her left hip.    Plan: Left ORIF versus hemiarthroplasty for femoral neck fracture once cleared medically and INR is at an acceptable level.    Thank you very much for this consultation.  Please do not hesitate to contact me with any questions or concerns.    Brady Kaplan M.D.      This dictation was created using voice recognition software.  If there are any questions about inaccuracies please do not hesitate to contact me.

## 2024-10-22 ENCOUNTER — ANESTHESIA (OUTPATIENT)
Dept: OPERATING ROOM | Facility: HOSPITAL | Age: 74
End: 2024-10-22
Payer: MEDICARE

## 2024-10-22 ENCOUNTER — APPOINTMENT (OUTPATIENT)
Dept: RADIOLOGY | Facility: HOSPITAL | Age: 74
DRG: 521 | End: 2024-10-22
Payer: MEDICARE

## 2024-10-22 LAB
ANION GAP BLDA CALCULATED.4IONS-SCNC: -4 MMO/L (ref 10–25)
ANION GAP SERPL CALC-SCNC: 10 MMOL/L (ref 10–20)
ANION GAP SERPL CALC-SCNC: 11 MMOL/L (ref 10–20)
ANION GAP SERPL CALC-SCNC: 9 MMOL/L (ref 10–20)
APTT PPP: 29 SECONDS (ref 27–38)
ARTERIAL PATENCY WRIST A: POSITIVE
BASE EXCESS BLDA CALC-SCNC: 16.4 MMOL/L (ref -2–3)
BASOPHILS # BLD AUTO: 0 X10*3/UL (ref 0–0.1)
BASOPHILS NFR BLD AUTO: 0 %
BODY TEMPERATURE: ABNORMAL
BUN SERPL-MCNC: 30 MG/DL (ref 6–23)
BUN SERPL-MCNC: 30 MG/DL (ref 6–23)
BUN SERPL-MCNC: 33 MG/DL (ref 6–23)
CA-I BLDA-SCNC: 1.37 MMOL/L (ref 1.1–1.33)
CALCIUM SERPL-MCNC: 8.9 MG/DL (ref 8.6–10.3)
CALCIUM SERPL-MCNC: 9.2 MG/DL (ref 8.6–10.3)
CALCIUM SERPL-MCNC: 9.3 MG/DL (ref 8.6–10.3)
CHLORIDE BLDA-SCNC: 104 MMOL/L (ref 98–107)
CHLORIDE SERPL-SCNC: 102 MMOL/L (ref 98–107)
CHLORIDE SERPL-SCNC: 102 MMOL/L (ref 98–107)
CHLORIDE SERPL-SCNC: 103 MMOL/L (ref 98–107)
CK SERPL-CCNC: 31 U/L (ref 0–215)
CO2 SERPL-SCNC: 34 MMOL/L (ref 21–32)
CO2 SERPL-SCNC: 35 MMOL/L (ref 21–32)
CO2 SERPL-SCNC: 37 MMOL/L (ref 21–32)
CREAT SERPL-MCNC: 0.93 MG/DL (ref 0.5–1.05)
CREAT SERPL-MCNC: 0.98 MG/DL (ref 0.5–1.05)
CREAT SERPL-MCNC: 1 MG/DL (ref 0.5–1.05)
EGFRCR SERPLBLD CKD-EPI 2021: 59 ML/MIN/1.73M*2
EGFRCR SERPLBLD CKD-EPI 2021: 61 ML/MIN/1.73M*2
EGFRCR SERPLBLD CKD-EPI 2021: 65 ML/MIN/1.73M*2
EOSINOPHIL # BLD AUTO: 0 X10*3/UL (ref 0–0.4)
EOSINOPHIL NFR BLD AUTO: 0 %
EPAP CMH2O: 6 CM H2O
ERYTHROCYTE [DISTWIDTH] IN BLOOD BY AUTOMATED COUNT: 14.2 % (ref 11.5–14.5)
GLUCOSE BLD MANUAL STRIP-MCNC: 163 MG/DL (ref 74–99)
GLUCOSE BLDA-MCNC: 134 MG/DL (ref 74–99)
GLUCOSE SERPL-MCNC: 106 MG/DL (ref 74–99)
GLUCOSE SERPL-MCNC: 131 MG/DL (ref 74–99)
GLUCOSE SERPL-MCNC: 154 MG/DL (ref 74–99)
HCO3 BLDA-SCNC: 42.7 MMOL/L (ref 22–26)
HCT VFR BLD AUTO: 37.7 % (ref 36–46)
HCT VFR BLD EST: 32 % (ref 36–46)
HGB BLD-MCNC: 10.9 G/DL (ref 12–16)
HGB BLDA-MCNC: 10.8 G/DL (ref 12–16)
IMM GRANULOCYTES # BLD AUTO: 0.02 X10*3/UL (ref 0–0.5)
IMM GRANULOCYTES NFR BLD AUTO: 0.4 % (ref 0–0.9)
INHALED O2 CONCENTRATION: 40 %
INR PPP: 1 (ref 0.9–1.1)
INR PPP: 1.2 (ref 0.9–1.1)
IPAP CMH2O: 24 CM H2O
LACTATE BLDA-SCNC: 1.7 MMOL/L (ref 0.4–2)
LYMPHOCYTES # BLD AUTO: 0.38 X10*3/UL (ref 0.8–3)
LYMPHOCYTES NFR BLD AUTO: 7.8 %
MAGNESIUM SERPL-MCNC: 2 MG/DL (ref 1.6–2.4)
MCH RBC QN AUTO: 30.2 PG (ref 26–34)
MCHC RBC AUTO-ENTMCNC: 28.9 G/DL (ref 32–36)
MCV RBC AUTO: 104 FL (ref 80–100)
MONOCYTES # BLD AUTO: 0.39 X10*3/UL (ref 0.05–0.8)
MONOCYTES NFR BLD AUTO: 8 %
NEUTROPHILS # BLD AUTO: 4.1 X10*3/UL (ref 1.6–5.5)
NEUTROPHILS NFR BLD AUTO: 83.8 %
NRBC BLD-RTO: 0 /100 WBCS (ref 0–0)
OXYHGB MFR BLDA: 97.6 % (ref 94–98)
PCO2 BLDA: 60 MM HG (ref 38–42)
PH BLDA: 7.46 PH (ref 7.38–7.42)
PHOSPHATE SERPL-MCNC: 4.1 MG/DL (ref 2.5–4.9)
PLATELET # BLD AUTO: 226 X10*3/UL (ref 150–450)
PO2 BLDA: 104 MM HG (ref 85–95)
POTASSIUM BLDA-SCNC: 5.3 MMOL/L (ref 3.5–5.3)
POTASSIUM SERPL-SCNC: 5.3 MMOL/L (ref 3.5–5.3)
POTASSIUM SERPL-SCNC: 6.2 MMOL/L (ref 3.5–5.3)
POTASSIUM SERPL-SCNC: 6.3 MMOL/L (ref 3.5–5.3)
PROTHROMBIN TIME: 11.8 SECONDS (ref 9.8–12.8)
PROTHROMBIN TIME: 13.8 SECONDS (ref 9.8–12.8)
RBC # BLD AUTO: 3.61 X10*6/UL (ref 4–5.2)
SAO2 % BLDA: 99 % (ref 94–100)
SODIUM BLDA-SCNC: 137 MMOL/L (ref 136–145)
SODIUM SERPL-SCNC: 141 MMOL/L (ref 136–145)
SODIUM SERPL-SCNC: 142 MMOL/L (ref 136–145)
SODIUM SERPL-SCNC: 143 MMOL/L (ref 136–145)
SPECIMEN DRAWN FROM PATIENT: ABNORMAL
UFH PPP CHRO-ACNC: 0.1 IU/ML
VENTILATOR MODE: ABNORMAL
WBC # BLD AUTO: 4.9 X10*3/UL (ref 4.4–11.3)

## 2024-10-22 PROCEDURE — 3700000002 HC GENERAL ANESTHESIA TIME - EACH INCREMENTAL 1 MINUTE: Performed by: ORTHOPAEDIC SURGERY

## 2024-10-22 PROCEDURE — 2500000004 HC RX 250 GENERAL PHARMACY W/ HCPCS (ALT 636 FOR OP/ED): Performed by: HOSPITALIST

## 2024-10-22 PROCEDURE — 3600000010 HC OR TIME - EACH INCREMENTAL 1 MINUTE - PROCEDURE LEVEL FIVE: Performed by: ORTHOPAEDIC SURGERY

## 2024-10-22 PROCEDURE — 2020000001 HC ICU ROOM DAILY

## 2024-10-22 PROCEDURE — 82947 ASSAY GLUCOSE BLOOD QUANT: CPT

## 2024-10-22 PROCEDURE — 2500000005 HC RX 250 GENERAL PHARMACY W/O HCPCS: Performed by: NURSE ANESTHETIST, CERTIFIED REGISTERED

## 2024-10-22 PROCEDURE — 36600 WITHDRAWAL OF ARTERIAL BLOOD: CPT

## 2024-10-22 PROCEDURE — 2500000004 HC RX 250 GENERAL PHARMACY W/ HCPCS (ALT 636 FOR OP/ED): Performed by: STUDENT IN AN ORGANIZED HEALTH CARE EDUCATION/TRAINING PROGRAM

## 2024-10-22 PROCEDURE — 2500000001 HC RX 250 WO HCPCS SELF ADMINISTERED DRUGS (ALT 637 FOR MEDICARE OP)

## 2024-10-22 PROCEDURE — 2500000002 HC RX 250 W HCPCS SELF ADMINISTERED DRUGS (ALT 637 FOR MEDICARE OP, ALT 636 FOR OP/ED): Performed by: NURSE PRACTITIONER

## 2024-10-22 PROCEDURE — 3600000005 HC OR TIME - INITIAL BASE CHARGE - PROCEDURE LEVEL FIVE: Performed by: ORTHOPAEDIC SURGERY

## 2024-10-22 PROCEDURE — 7100000001 HC RECOVERY ROOM TIME - INITIAL BASE CHARGE: Performed by: ORTHOPAEDIC SURGERY

## 2024-10-22 PROCEDURE — 2500000004 HC RX 250 GENERAL PHARMACY W/ HCPCS (ALT 636 FOR OP/ED)

## 2024-10-22 PROCEDURE — 2780000003 HC OR 278 NO HCPCS: Performed by: ORTHOPAEDIC SURGERY

## 2024-10-22 PROCEDURE — 3700000001 HC GENERAL ANESTHESIA TIME - INITIAL BASE CHARGE: Performed by: ORTHOPAEDIC SURGERY

## 2024-10-22 PROCEDURE — 2500000001 HC RX 250 WO HCPCS SELF ADMINISTERED DRUGS (ALT 637 FOR MEDICARE OP): Performed by: SURGERY

## 2024-10-22 PROCEDURE — 2720000007 HC OR 272 NO HCPCS: Performed by: ORTHOPAEDIC SURGERY

## 2024-10-22 PROCEDURE — 76937 US GUIDE VASCULAR ACCESS: CPT

## 2024-10-22 PROCEDURE — 2500000002 HC RX 250 W HCPCS SELF ADMINISTERED DRUGS (ALT 637 FOR MEDICARE OP, ALT 636 FOR OP/ED): Performed by: HOSPITALIST

## 2024-10-22 PROCEDURE — 82374 ASSAY BLOOD CARBON DIOXIDE: CPT | Performed by: HOSPITALIST

## 2024-10-22 PROCEDURE — 82550 ASSAY OF CK (CPK): CPT

## 2024-10-22 PROCEDURE — 85520 HEPARIN ASSAY: CPT

## 2024-10-22 PROCEDURE — C1776 JOINT DEVICE (IMPLANTABLE): HCPCS | Performed by: ORTHOPAEDIC SURGERY

## 2024-10-22 PROCEDURE — 2500000005 HC RX 250 GENERAL PHARMACY W/O HCPCS: Performed by: HOSPITALIST

## 2024-10-22 PROCEDURE — 36415 COLL VENOUS BLD VENIPUNCTURE: CPT | Performed by: HOSPITALIST

## 2024-10-22 PROCEDURE — 84132 ASSAY OF SERUM POTASSIUM: CPT

## 2024-10-22 PROCEDURE — 2500000002 HC RX 250 W HCPCS SELF ADMINISTERED DRUGS (ALT 637 FOR MEDICARE OP, ALT 636 FOR OP/ED)

## 2024-10-22 PROCEDURE — 7100000002 HC RECOVERY ROOM TIME - EACH INCREMENTAL 1 MINUTE: Performed by: ORTHOPAEDIC SURGERY

## 2024-10-22 PROCEDURE — 94640 AIRWAY INHALATION TREATMENT: CPT

## 2024-10-22 PROCEDURE — 85610 PROTHROMBIN TIME: CPT

## 2024-10-22 PROCEDURE — 2500000004 HC RX 250 GENERAL PHARMACY W/ HCPCS (ALT 636 FOR OP/ED): Mod: JZ

## 2024-10-22 PROCEDURE — 2500000004 HC RX 250 GENERAL PHARMACY W/ HCPCS (ALT 636 FOR OP/ED): Performed by: NURSE ANESTHETIST, CERTIFIED REGISTERED

## 2024-10-22 PROCEDURE — 85025 COMPLETE CBC W/AUTO DIFF WBC: CPT | Performed by: HOSPITALIST

## 2024-10-22 PROCEDURE — 80048 BASIC METABOLIC PNL TOTAL CA: CPT | Performed by: HOSPITALIST

## 2024-10-22 PROCEDURE — 99291 CRITICAL CARE FIRST HOUR: CPT

## 2024-10-22 PROCEDURE — 94660 CPAP INITIATION&MGMT: CPT

## 2024-10-22 PROCEDURE — 84132 ASSAY OF SERUM POTASSIUM: CPT | Performed by: SURGERY

## 2024-10-22 PROCEDURE — 27236 TREAT THIGH FRACTURE: CPT | Performed by: ORTHOPAEDIC SURGERY

## 2024-10-22 PROCEDURE — 27236 TREAT THIGH FRACTURE: CPT | Performed by: PHYSICIAN ASSISTANT

## 2024-10-22 PROCEDURE — C1713 ANCHOR/SCREW BN/BN,TIS/BN: HCPCS | Performed by: ORTHOPAEDIC SURGERY

## 2024-10-22 PROCEDURE — 2500000005 HC RX 250 GENERAL PHARMACY W/O HCPCS

## 2024-10-22 PROCEDURE — 84100 ASSAY OF PHOSPHORUS: CPT | Performed by: HOSPITALIST

## 2024-10-22 PROCEDURE — 2500000004 HC RX 250 GENERAL PHARMACY W/ HCPCS (ALT 636 FOR OP/ED): Mod: JZ | Performed by: NURSE PRACTITIONER

## 2024-10-22 PROCEDURE — 0SRS0J9 REPLACEMENT OF LEFT HIP JOINT, FEMORAL SURFACE WITH SYNTHETIC SUBSTITUTE, CEMENTED, OPEN APPROACH: ICD-10-PCS | Performed by: ORTHOPAEDIC SURGERY

## 2024-10-22 PROCEDURE — 2500000005 HC RX 250 GENERAL PHARMACY W/O HCPCS: Performed by: ORTHOPAEDIC SURGERY

## 2024-10-22 PROCEDURE — 73502 X-RAY EXAM HIP UNI 2-3 VIEWS: CPT | Mod: LT

## 2024-10-22 PROCEDURE — 83735 ASSAY OF MAGNESIUM: CPT | Performed by: HOSPITALIST

## 2024-10-22 DEVICE — IMPLANTABLE DEVICE
Type: IMPLANTABLE DEVICE | Site: HIP | Status: FUNCTIONAL
Brand: VERSYS®

## 2024-10-22 DEVICE — IMPLANTABLE DEVICE
Type: IMPLANTABLE DEVICE | Site: HIP | Status: FUNCTIONAL
Brand: BIOMET® BONE CEMENT R

## 2024-10-22 DEVICE — IMPLANTABLE DEVICE: Type: IMPLANTABLE DEVICE | Site: HIP | Status: FUNCTIONAL

## 2024-10-22 RX ORDER — WARFARIN SODIUM 5 MG/1
5 TABLET ORAL
Status: DISCONTINUED | OUTPATIENT
Start: 2024-10-22 | End: 2024-10-27 | Stop reason: HOSPADM

## 2024-10-22 RX ORDER — ONDANSETRON HYDROCHLORIDE 2 MG/ML
INJECTION, SOLUTION INTRAVENOUS AS NEEDED
Status: DISCONTINUED | OUTPATIENT
Start: 2024-10-22 | End: 2024-10-22

## 2024-10-22 RX ORDER — WATER 1 ML/ML
IRRIGANT IRRIGATION AS NEEDED
Status: DISCONTINUED | OUTPATIENT
Start: 2024-10-22 | End: 2024-10-22 | Stop reason: HOSPADM

## 2024-10-22 RX ORDER — CALCIUM GLUCONATE 20 MG/ML
1 INJECTION, SOLUTION INTRAVENOUS ONCE
Status: COMPLETED | OUTPATIENT
Start: 2024-10-22 | End: 2024-10-22

## 2024-10-22 RX ORDER — FENTANYL CITRATE 50 UG/ML
INJECTION, SOLUTION INTRAMUSCULAR; INTRAVENOUS AS NEEDED
Status: DISCONTINUED | OUTPATIENT
Start: 2024-10-22 | End: 2024-10-22

## 2024-10-22 RX ORDER — DEXTROSE 50 % IN WATER (D50W) INTRAVENOUS SYRINGE
25 ONCE
Status: COMPLETED | OUTPATIENT
Start: 2024-10-22 | End: 2024-10-22

## 2024-10-22 RX ORDER — FENTANYL CITRATE 50 UG/ML
25 INJECTION, SOLUTION INTRAMUSCULAR; INTRAVENOUS EVERY 5 MIN PRN
Status: DISCONTINUED | OUTPATIENT
Start: 2024-10-22 | End: 2024-10-22 | Stop reason: HOSPADM

## 2024-10-22 RX ORDER — PRAMIPEXOLE DIHYDROCHLORIDE 0.25 MG/1
0.5 TABLET ORAL 4 TIMES DAILY PRN
Status: DISCONTINUED | OUTPATIENT
Start: 2024-10-22 | End: 2024-10-27 | Stop reason: HOSPADM

## 2024-10-22 RX ORDER — NYSTATIN 100000 [USP'U]/G
1 POWDER TOPICAL 3 TIMES DAILY PRN
Status: DISCONTINUED | OUTPATIENT
Start: 2024-10-22 | End: 2024-10-27 | Stop reason: HOSPADM

## 2024-10-22 RX ORDER — CEFAZOLIN SODIUM 2 G/100ML
2 INJECTION, SOLUTION INTRAVENOUS ONCE
Status: COMPLETED | OUTPATIENT
Start: 2024-10-22 | End: 2024-10-22

## 2024-10-22 RX ORDER — TRANEXAMIC ACID 650 MG/1
1950 TABLET ORAL ONCE
Status: COMPLETED | OUTPATIENT
Start: 2024-10-23 | End: 2024-10-23

## 2024-10-22 RX ORDER — TRANEXAMIC ACID 650 MG/1
1950 TABLET ORAL ONCE
Status: COMPLETED | OUTPATIENT
Start: 2024-10-22 | End: 2024-10-22

## 2024-10-22 RX ORDER — LIDOCAINE HYDROCHLORIDE 20 MG/ML
INJECTION, SOLUTION INFILTRATION; PERINEURAL AS NEEDED
Status: DISCONTINUED | OUTPATIENT
Start: 2024-10-22 | End: 2024-10-22

## 2024-10-22 RX ORDER — HEPARIN SODIUM 10000 [USP'U]/100ML
0-4000 INJECTION, SOLUTION INTRAVENOUS CONTINUOUS
Status: DISCONTINUED | OUTPATIENT
Start: 2024-10-22 | End: 2024-10-27 | Stop reason: HOSPADM

## 2024-10-22 RX ORDER — ETOMIDATE 2 MG/ML
INJECTION INTRAVENOUS AS NEEDED
Status: DISCONTINUED | OUTPATIENT
Start: 2024-10-22 | End: 2024-10-22

## 2024-10-22 RX ORDER — CEFAZOLIN SODIUM 2 G/100ML
2 INJECTION, SOLUTION INTRAVENOUS EVERY 8 HOURS
Status: COMPLETED | OUTPATIENT
Start: 2024-10-22 | End: 2024-10-23

## 2024-10-22 RX ORDER — SODIUM CHLORIDE, SODIUM LACTATE, POTASSIUM CHLORIDE, CALCIUM CHLORIDE 600; 310; 30; 20 MG/100ML; MG/100ML; MG/100ML; MG/100ML
100 INJECTION, SOLUTION INTRAVENOUS CONTINUOUS
Status: DISCONTINUED | OUTPATIENT
Start: 2024-10-22 | End: 2024-10-22 | Stop reason: HOSPADM

## 2024-10-22 RX ORDER — ROCURONIUM BROMIDE 10 MG/ML
INJECTION, SOLUTION INTRAVENOUS AS NEEDED
Status: DISCONTINUED | OUTPATIENT
Start: 2024-10-22 | End: 2024-10-22

## 2024-10-22 RX ORDER — MEPERIDINE HYDROCHLORIDE 25 MG/ML
12.5 INJECTION INTRAMUSCULAR; INTRAVENOUS; SUBCUTANEOUS EVERY 10 MIN PRN
Status: DISCONTINUED | OUTPATIENT
Start: 2024-10-22 | End: 2024-10-22 | Stop reason: HOSPADM

## 2024-10-22 RX ORDER — FENTANYL CITRATE 50 UG/ML
50 INJECTION, SOLUTION INTRAMUSCULAR; INTRAVENOUS EVERY 5 MIN PRN
Status: DISCONTINUED | OUTPATIENT
Start: 2024-10-22 | End: 2024-10-22 | Stop reason: HOSPADM

## 2024-10-22 ASSESSMENT — PAIN DESCRIPTION - LOCATION
LOCATION: HIP
LOCATION: HIP

## 2024-10-22 ASSESSMENT — PAIN - FUNCTIONAL ASSESSMENT
PAIN_FUNCTIONAL_ASSESSMENT: 0-10

## 2024-10-22 ASSESSMENT — PAIN SCALES - GENERAL
PAINLEVEL_OUTOF10: 0 - NO PAIN
PAINLEVEL_OUTOF10: 6
PAINLEVEL_OUTOF10: 7
PAINLEVEL_OUTOF10: 0 - NO PAIN
PAINLEVEL_OUTOF10: 6
PAINLEVEL_OUTOF10: 0 - NO PAIN
PAINLEVEL_OUTOF10: 0 - NO PAIN
PAINLEVEL_OUTOF10: 7
PAINLEVEL_OUTOF10: 0 - NO PAIN

## 2024-10-22 ASSESSMENT — PAIN DESCRIPTION - DESCRIPTORS: DESCRIPTORS: ACHING

## 2024-10-22 ASSESSMENT — PAIN DESCRIPTION - ORIENTATION: ORIENTATION: LEFT

## 2024-10-22 NOTE — OP NOTE
Arthroplasty Partial Hip (L) Operative Note    Preop diagnosis: Left femoral neck fracture    Postop diagnosis: Same    Procedure: Left hip fracture hemiarthroplasty using a [employer] Advocate size 16 standard neck offset cemented femoral component, 46 mm endoprosthetic head with a 7 mm neck adapter    Surgeon: Eligio Whitten MD  Assistant: Dara Potts PA-C      Findings: see procedure details    EBL: minimal    Procedure Details:   Indications:  The patient had fallen injuring the left hip.  The patient was found to have a displaced femoral neck fracture.  Risks and benefits of hip hemiarthroplasty were noted with the patient and family.  These include infection, stiffness, nerve damage, continued pain as well as need for subsequent operations.  Informed consent was obtained prior to arrival in the operating room.    Procedure:  Upon arrival, the patient was identified. The patient was transported from a stretcher to the operating table and placed in the lateral decubitus position. All bony prominences were adequately padded.  The left hip was then prepped and draped in the usual sterile fashion. A standard posterior approach to the hip was utilized. The incision was carried through the subcutaneous tissue. Hemostasis was obtained. Fascia was incised in line with the skin incision. Fat overlying the external rotators was elevated. A T-shaped capsular incision made in the rotators with flaps tagged for later repair. The head and neck were then everted. A standard neck cut was made 1 cm above the lesser trochanter. The head and neck were removed and measured to the appropriate size. A canal finder was used to start the approach to the femur, followed by power rasping to size 3 and broaching to a tight fit. The trial component was then inserted and multiple head and neck trials were assembled to provide good stability with full range of motion. Trial components were removed. Cement was mixed in the usual third  generation fashion. The cement was inserted, followed by the fracture stem, which was held into place until all cement was fully hardened. The head and hemiarthroplasty components were then assembled and the hip was reduced. Excellent stability was again noted through a full range of motion. The wound was irrigated with sterile saline. The rotators were repaired using 0 Vicryl suture. The rotators were reattached to bone using #5 Tycron suture placed through the bone. The fascia was closed with 0 Vicryl suture, subcutaneous tissue closed with 2-0 Vicryl, and skin was approximated with 4-0 Monocryl. All sponge and needle counts were correct prior to closure. Sterile dressing was applied. The hip was placed in an abduction pillow and the patient was awoken from their anesthetic. The patient was taken to the recovery room in stable condition.    Dara Potts PA-C was present throughout the entire case. Given the nature of the disease process and the procedure, a skilled surgical first assistant was necessary during the case. The assistant was necessary to hold retractors and to manipulate the extremity during the procedure. A certified scrub tech was at the back table managing the instruments and supplies for the surgical case.       Complications:  None; patient tolerated the procedure well.    Disposition: PACU - hemodynamically stable.  Condition: stable         Eligio Whitten  Phone Number: [phone]

## 2024-10-22 NOTE — ANESTHESIA PREPROCEDURE EVALUATION
Maria Guadalupe Marks is a 74 y.o. female here for:      Arthroplasty Partial Hip  With Eligio Whitten MD  Pre-Op Diagnosis Codes:      * Closed traumatic nondisplaced fracture of neck of left femur, initial encounter [S72.002A]    Lab Results   Component Value Date    HGB 10.9 (L) 10/22/2024    HCT 37.7 10/22/2024    WBC 4.9 10/22/2024     10/22/2024     10/22/2024    K 5.3 10/22/2024     10/22/2024    CREATININE 0.93 10/22/2024    BUN 33 (H) 10/22/2024       Social History     Substance and Sexual Activity   Drug Use Not on file      Tobacco Use: Medium Risk (10/21/2024)    Patient History    • Smoking Tobacco Use: Former    • Smokeless Tobacco Use: Never    • Passive Exposure: Not on file      Social History     Substance and Sexual Activity   Alcohol Use None        Allergies   Allergen Reactions   • Morphine Dizziness     Headache and dizziness per patient.    • Adhesive Tape-Silicones Rash       Current Outpatient Medications   Medication Instructions   • acetaminophen (TYLENOL 8 HOUR) 650 mg, As needed   • albuterol 90 mcg/actuation inhaler 2 puffs, Every 4 hours PRN   • amitriptyline (Elavil) 75 mg tablet Take by mouth.   • calcium carbonate-vitamin D3 500 mg-5 mcg (200 unit) tablet 1 tablet, oral, Daily   • carvedilol (COREG) 6.25 mg, Every 12 hours   • DULoxetine (CYMBALTA) 60 mg, Daily   • ipratropium-albuteroL (Duo-Neb) 0.5-2.5 mg/3 mL nebulizer solution 3 mL, 3 times daily   • losartan (Cozaar) 50 mg tablet Take by mouth.   • lovastatin (Mevacor) 40 mg tablet 1 tablet, Nightly   • magnesium oxide (MAG-OX) 500 mg, Daily   • metOLazone (ZAROXOLYN) 2.5 mg   • montelukast (Singulair) 10 mg tablet 1 tablet, Nightly   • neomycin-polymyxin B-dexameth (Polydex) 3.5 mg/g-10,000 unit/g-0.1 % ointment ophthalmic ointment Right Eye, 4 times daily, Apply 1/4 inch ribbon to right eye lid 4 times a day and at bed    • nystatin (Mycostatin) 100,000 unit/gram powder 1 Application, Topical, As needed   •  pramipexole (Mirapex) 0.5 mg tablet TAKE 1 TABLET BY MOUTH FOUR TIMES DAILY AS NEEDED (RESTLESS LEG).   • pregabalin (LYRICA) 75 mg, 3 times daily   • torsemide (Demadex) 20 mg tablet 1-2  tablets daily for leg edema   • warfarin (Coumadin) 2.5 mg tablet oral   • warfarin (Coumadin) 5 mg tablet Take 7.5 mg on MonWed and 5 mg all other days of the week or as directed by [facility].       Past Medical History:   Diagnosis Date   • COPD (chronic obstructive pulmonary disease) (Multi)        History reviewed. No pertinent surgical history.    No family history on file.    Relevant Problems   Cardiac   (+) Essential hypertension   (+) Hyperlipidemia      Pulmonary   (+) COPD (chronic obstructive pulmonary disease) (Multi)      Neuro   (+) Lumbar radiculopathy      Hematology   (+) Chronic anemia   (+) DVT (deep venous thrombosis) (Multi)   (+) Factor V Leiden, prothrombin gene mutation (Multi)      Musculoskeletal   (+) Chronic low back pain       Visit Vitals  /65   Pulse 75   Temp 36.1 °C (97 °F) (Temporal)   Resp 17   Ht 1.524 m (5')   Wt 72.2 kg (159 lb 2.8 oz)   SpO2 95%   BMI 31.09 kg/m²   Smoking Status Former   BSA 1.75 m²       NPO Details:  NPO/Void Status  Date of Last Liquid: 10/21/24  Date of Last Solid: 10/21/24        Physical Exam    Airway  Mallampati: II     Cardiovascular   Rhythm: regular  Rate: normal     Dental - normal exam     Pulmonary   (+) decreased breath sounds, wheezes     Abdominal   (+) obese           Anesthesia Plan    History of general anesthesia?: yes  History of complications of general anesthesia?: no    ASA 4     general     intravenous induction   Anesthetic plan and risks discussed with patient.    Plan discussed with CRNA.

## 2024-10-22 NOTE — PROGRESS NOTES
[facility]       Date:  10/22/2024  Patient:  Maria Guadalupe Marks  YOB: 1950  MRN:  [id number]   Admit Date:  10/20/2024  ========================================================================================================    Chief Complaint   Patient presents with    Fall     Fall  with left hip pain +coumadin, no head injury, no LOC         History of Present Illness:  Maria Guadalupe Mraks is a 74 y.o. year old female patient with Past Medical History of Afib on Warfarin, Hx of DVT, Hypertension, lumbar radiculopathy, hyperlipidemia, CKD 3a, hypercoagulable state secondary to factor V Leiden mutation, COPD was chronic respiratory failure on home oxygen 2 L/min.  Initially presented to the ED status post fall patient reported that she was getting out of her bed and fell on her left side striking her left knee and left hip.  She denies hitting her head or losing consciousness.  Patient also reported wheezing, nonproductive cough and wears 2 L nasal cannula at baseline at home.    ER course: Patient was awake, alert, oriented, no acute distress.  She was hemodynamically stable.  She was maintaining saturation well on 2 L/min her baseline.  However she does have some wheezing on auscultation and has some cough.  Her chest x-ray reported small right pleural effusion, and has a chronic interstitial coarsening suggestive of COPD/emphysema  Her labs shows mild hyperkalemia 5.4, no leukocytosis, and creatinine was 1.14 similar to baseline.  Her INR was 2.4.  Hemoglobin level was 9.5 from baseline 10.3.  Trauma imaging's did confirm acute mildly impacted left transcervical femoral neck fracture. Case discussed with orthopedic surgery, recommended holding warfarin, and possible surgery tomorrow if INR is acceptable.  Given vitamin K.    While under medicine service patient developed worsening acute on chronic hypercapnic respiratory failure with hypoxia.  She was placed on BiPAP, initial ABG shows  pH of 7.30, pCO2 of 89, PaO2 76 HCO3 43.8.  Patient is lethargic and confused.  Ultimately admitted to ICU for acute on chronic hypercapnic respiratory failure with hypoxia.    Interval ICU Events:  10/21: Admitted to ICU for acute on chronic hypercapnic respiratory failure with hypoxia.  Pending repeat ABG, pending repeat chest x-ray.  10/22: Hyperkalemia this AM, intiating treatment, meds reviewed, MICHAEL stable, check CK today. INR subtheurapeutic, verified with ORTHO initiating heparin gtt, will stop prior to OR at 10am. Otherwise stable, BiPAP overnight. Tentative OR at 1:30 today.    Medical History:  Past Medical History:   Diagnosis Date    COPD (chronic obstructive pulmonary disease) (Multi)      History reviewed. No pertinent surgical history.  Medications Prior to Admission   Medication Sig Dispense Refill Last Dose/Taking    acetaminophen (Tylenol 8 HOUR) 650 mg ER tablet Take 1 tablet (650 mg) by mouth if needed.   10/20/2024    albuterol 90 mcg/actuation inhaler Inhale 2 puffs every 4 hours if needed.   10/20/2024    DULoxetine (Cymbalta) 60 mg DR capsule Take 1 capsule (60 mg) by mouth once daily. Do not crush or chew.   10/20/2024    ipratropium-albuteroL (Duo-Neb) 0.5-2.5 mg/3 mL nebulizer solution Inhale 3 mL 3 times a day.   10/20/2024    lovastatin (Mevacor) 40 mg tablet Take 1 tablet (40 mg) by mouth once daily at bedtime.   10/20/2024    magnesium oxide (Mag-Ox) 400 mg tablet Take 1.25 tablets (500 mg) by mouth once daily.   10/20/2024    metOLazone (Zaroxolyn) 2.5 mg tablet Take 1 tablet (2.5 mg) by mouth.   10/20/2024    montelukast (Singulair) 10 mg tablet Take 1 tablet (10 mg) by mouth once daily at bedtime.   10/20/2024    pramipexole (Mirapex) 0.5 mg tablet TAKE 1 TABLET BY MOUTH FOUR TIMES DAILY AS NEEDED (RESTLESS LEG).   10/20/2024    torsemide (Demadex) 20 mg tablet 1-2  tablets daily for leg edema   10/20/2024    warfarin (Coumadin) 5 mg tablet Take 7.5 mg on MonWed and 5 mg all other  days of the week or as directed by [facility].   10/20/2024    amitriptyline (Elavil) 75 mg tablet Take by mouth. (Patient not taking: Reported on 10/21/2024)   Not Taking    calcium carbonate-vitamin D3 500 mg-5 mcg (200 unit) tablet Take 1 tablet by mouth once daily.       carvedilol (Coreg) 6.25 mg tablet Take 1 tablet (6.25 mg) by mouth every 12 hours. (Patient not taking: Reported on 10/21/2024)   Not Taking    losartan (Cozaar) 50 mg tablet Take by mouth. (Patient not taking: Reported on 10/21/2024)   Not Taking    neomycin-polymyxin B-dexameth (Polydex) 3.5 mg/g-10,000 unit/g-0.1 % ointment ophthalmic ointment Apply to right eye 4 times a day. Apply 1/4 inch ribbon to right eye lid 4 times a day and at bed       nystatin (Mycostatin) 100,000 unit/gram powder Apply 1 Application topically if needed for rash.       pregabalin (Lyrica) 75 mg capsule Take 1 capsule (75 mg) by mouth 3 times a day. (Patient not taking: Reported on 10/21/2024)   Not Taking    warfarin (Coumadin) 2.5 mg tablet Take by mouth.        Morphine and Adhesive tape-silicones  Social History     Tobacco Use    Smoking status: Former     Types: Cigarettes    Smokeless tobacco: Never     No family history on file.    Review of Systems:  14 point review of systems was completed and negative except for those specially mention in my HPI    Physical Exam:    Heart Rate:  []   Temp:  [35.5 °C (95.9 °F)-36.1 °C (97 °F)]   Resp:  [10-35]   BP: (100-156)/(51-83)   Weight:  [72.2 kg (159 lb 2.8 oz)]   SpO2:  [94 %-100 %]     Physical Exam  Constitutional:       General: She is awake.      Appearance: Normal appearance.   HENT:      Head: Normocephalic and atraumatic.      Mouth/Throat:      Mouth: Mucous membranes are dry.   Cardiovascular:      Rate and Rhythm: Normal rate. Rhythm irregular.      Pulses: Normal pulses.      Heart sounds: Normal heart sounds.   Pulmonary:      Effort: Pulmonary effort is normal. Tachypnea present.       Breath sounds: Rhonchi present.   Abdominal:      General: Bowel sounds are normal. There is distension.      Palpations: Abdomen is soft.      Tenderness: There is no abdominal tenderness.   Musculoskeletal:      Cervical back: Full passive range of motion without pain.      Right lower leg: No edema.      Left lower leg: No edema.      Comments: Tender to palpation left hip, with limited range of motion due to pain.    Skin:     General: Skin is warm.   Neurological:      General: No focal deficit present.      Mental Status: She is oriented to person, place, and time. She is lethargic and confused.         Objective:    CT hip left wo IV contrast    Result Date: 10/22/2024  Interpreted By:  Heladio Beckett, STUDY: CT HIP LEFT WO IV CONTRAST; CT 3D RECONSTRUCTION; ;  10/21/2024 10:48 am; 10/21/2024 10:51 am   INDICATION: Signs/Symptoms:left femoral neck fracture.     COMPARISON: Plain film radiographs October 20   ACCESSION NUMBER(S): [id number]; [id number]   ORDERING CLINICIAN: JONATHAN GAMEZ   TECHNIQUE: Multiple thin-section axial images left hip reconstructed in the sagittal and coronal plane without contrast.   FINDINGS: Again note is made of a nondisplaced transcervical left femoral neck fracture similar to recent plain film. Slight posterior angulation noted.   No evidence of dislocation.   Osteoarthritis of the left hip with labral calcifications.   No other acute fractures are seen.   No bony lesions.   No focal fluid collections.   Gluteus medius atrophic consistent with chronic tear.   No other findings suggestive of acute tendinous tear.   Hamstring origin calcifications.   Deformity of the left parasymphyseal pubis consistent with remote healed fractures.       Nondisplaced transcervical left femoral neck fracture.   Remote left parasymphyseal pubic fractures.   Chronic tear left gluteus medius.     MACRO: None   Signed by: Heladio Beckett 10/22/2024 7:34 AM Dictation workstation:   [id number]    CT  3D reconstruction    Result Date: 10/22/2024  Interpreted By:  Heladio Beckett, STUDY: CT HIP LEFT WO IV CONTRAST; CT 3D RECONSTRUCTION; ;  10/21/2024 10:48 am; 10/21/2024 10:51 am   INDICATION: Signs/Symptoms:left femoral neck fracture.     COMPARISON: Plain film radiographs October 20   ACCESSION NUMBER(S): [id number]; [id number]   ORDERING CLINICIAN: JONATHAN GAMEZ   TECHNIQUE: Multiple thin-section axial images left hip reconstructed in the sagittal and coronal plane without contrast.   FINDINGS: Again note is made of a nondisplaced transcervical left femoral neck fracture similar to recent plain film. Slight posterior angulation noted.   No evidence of dislocation.   Osteoarthritis of the left hip with labral calcifications.   No other acute fractures are seen.   No bony lesions.   No focal fluid collections.   Gluteus medius atrophic consistent with chronic tear.   No other findings suggestive of acute tendinous tear.   Hamstring origin calcifications.   Deformity of the left parasymphyseal pubis consistent with remote healed fractures.       Nondisplaced transcervical left femoral neck fracture.   Remote left parasymphyseal pubic fractures.   Chronic tear left gluteus medius.     MACRO: None   Signed by: Heladio Beckett 10/22/2024 7:34 AM Dictation workstation:   [id number]    XR chest 1 view    Result Date: 10/21/2024  Interpreted By:  Abby Esquivel, STUDY: XR CHEST 1 VIEW;  10/21/2024 7:20 pm   INDICATION: Signs/Symptoms:acute on chronic hypercapnic/hypoxic respiratory failure.   COMPARISON: Chest x-ray 10/20/2024.   ACCESSION NUMBER(S): [id number]   ORDERING CLINICIAN: RICHARD ROBERTS   FINDINGS: Multiple overlying leads are present.   CARDIOMEDIASTINAL SILHOUETTE: Cardiomediastinal silhouette is stable in size and configuration. Calcified mediastinal hilar lymph nodes noted. Atherosclerotic calcification of the aorta.   LUNGS: Bibasilar opacities with blunting of the costophrenic angles likely relate  to layering effusions and atelectasis. Superimposed infection not excluded. No pneumothorax. Biapical pleuroparenchymal scarring. Stable mild interstitial prominence.   ABDOMEN: No remarkable upper abdominal findings.   BONES: Multilevel degenerative changes of the spine.       Cardiomegaly with mild interstitial prominence suggestive of developing interstitial edema/CHF.   Bibasilar opacities likely relate to layering effusions and atelectasis. Superimposed infection not excluded. Attention on continued follow-up is advised.   Findings suggestive of prior granulomatous infection.   MACRO: None   Signed by: Abby Esquivel 10/21/2024 7:51 PM Dictation workstation:   [id number]    ECG 12 Lead    Result Date: 10/21/2024  Atrial fibrillation with premature ventricular or aberrantly conducted complexes Rightward axis ST & T wave abnormality, consider lateral ischemia Abnormal ECG When compared with ECG of 14-APR-2022 19:29, Atrial fibrillation has replaced Sinus rhythm Questionable change in QRS axis T wave inversion now evident in Anterior leads    XR shoulder left 2+ views    Result Date: 10/21/2024  Interpreted By:  Carola Oliva, STUDY: XR SHOULDER LEFT 2+ VIEWS; ;  10/20/2024 11:52 pm   INDICATION: Signs/Symptoms:fall.   COMPARISON: None.   ACCESSION NUMBER(S): [id number]   ORDERING CLINICIAN: YAKELIN MOROCHO   FINDINGS: Two views left shoulder. No acute fracture or malalignment. Left glenohumeral osteoarthrosis noted. Several chronic left rib fractures are noted. The left lung is clear       No acute fracture or malalignment.   Glenohumeral osteoarthrosis.     MACRO: None   Signed by: Carola Oliva 10/21/2024 12:43 AM Dictation workstation:   [id number]    XR pelvis 1-2 views    Result Date: 10/21/2024  Interpreted By:  Carola Oliva, STUDY: XR PELVIS 1-2 VIEWS; XR FEMUR LEFT 2+ VIEWS; ;  10/20/2024 11:52 pm   INDICATION: Signs/Symptoms:fall.   COMPARISON: None.   ACCESSION NUMBER(S): [id number]; [id number]    ORDERING CLINICIAN: YAKELIN MOROCHO   FINDINGS: Single view pelvis and two views left femur. There is an acute, mildly impacted transcervical femoral neck fracture. Mild bilateral hip osteoarthrosis. Chronic left pubic rami and body fractures.       Acute, mildly impacted left transcervical femoral neck fracture.     MACRO: None   Signed by: Carola Oliva 10/21/2024 12:42 AM Dictation workstation:   [id number]    XR femur left 2+ views    Result Date: 10/21/2024  Interpreted By:  Carola Oliva, STUDY: XR PELVIS 1-2 VIEWS; XR FEMUR LEFT 2+ VIEWS; ;  10/20/2024 11:52 pm   INDICATION: Signs/Symptoms:fall.   COMPARISON: None.   ACCESSION NUMBER(S): [id number]; [id number]   ORDERING CLINICIAN: YAKELIN MOROCHO   FINDINGS: Single view pelvis and two views left femur. There is an acute, mildly impacted transcervical femoral neck fracture. Mild bilateral hip osteoarthrosis. Chronic left pubic rami and body fractures.       Acute, mildly impacted left transcervical femoral neck fracture.     MACRO: None   Signed by: Carola Oliva 10/21/2024 12:42 AM Dictation workstation:   [id number]    XR chest 1 view    Result Date: 10/21/2024  Interpreted By:  Carola Oliva, STUDY: XR CHEST 1 VIEW;  10/20/2024 11:52 pm   INDICATION: Signs/Symptoms:fall.   COMPARISON: 04/14/2022   ACCESSION NUMBER(S): [id number]   ORDERING CLINICIAN: YAKELIN MOROCHO   FINDINGS:     CARDIOMEDIASTINAL SILHOUETTE: Stable cardiomegaly.   LUNGS: Chronic interstitial coarsening suggesting COPD/emphysema. There is blunting of the right costophrenic angle. No pulmonary consolidation or pneumothorax.   ABDOMEN: No remarkable upper abdominal findings.   BONES: No acute osseous abnormality.       Small right pleural effusion or pleural thickening.   Chronic interstitial coarsening suggesting COPD/emphysema.   MACRO: None   Signed by: Carola Oliva 10/21/2024 12:40 AM Dictation workstation:   [id number]      Assessment/Plan:    I am currently managing this  critically ill patient for the following problems:    Neuro/Psych/Pain Ctrl/Sedation:  Metabolic Encephalopathy 2/2 CO2 narcosis improved  Hx Lumbar Radiculopathy  See respiratory for BIPAP and repeat ABG  CAM ICU  Delirium precautions  Continue home cymbalta, continued Mirapex for RLS    Respiratory/ENT:  Acute on chronic hypercapnic respiratory failure  COPD in exacerbation  Continue BIPAP, currently on 15/8, wears 2L NC at baseline  Keep SPO2 greater than 90%  Q 4 hour Duonebs  40 mg IV solumedrol BID    Cardiovascular:  Hx HTN  Hx Afib on Warfarin  Hx DVT  Holding Warfarin in setting of L femur repair- received Vitamin K for reversal   Heparin gtt today  Currently afib with controlled rates  Continue home carvedilol, home torsemide   Daily EKG    GI:  NPO for surgery  Advance diet post-op  PPI    Renal/Volume Status (Intra & Extravascular):  Hx CKD 3  Hyperkalemia  Baseline Scr 1.10  Current Scr .98, will monitor RFP and avoid nephrotoxic medications  Replete electrolytes as indicated  CK pending  S/p Ca/Dextrose/Insulin  x2, initiated Lokelma TID  Repeat BMP @ 10  Daily CMP  Monitor UOP    Endocrine  No acute issues  Monitor for need for SSI, defer at this time    Infectious Disease:  No acute issues  Daily CBC  Monitor for SIRS    Heme/Onc:  Hx Factor V Leiden   Initial INR 2.4, given Vitamin K current INR 2.0  Cont Daily Coags   Heparin gtt  Transfuse if HGB less than 7  Daily CBC      OBGYN/MSK:  Left Femur Fx s/p Fall  Requiring surgical intervention  If medically cleared, OR at 1:30pm today with Dr Whitten  Optimize pain control   Orthopedics consulted- appreciate recommendations     Ethics/Code Status:  Full Code     :  DVT Prophylaxis: Heparin gtt  GI Prophylaxis: home PPI  Bowel Regimen: Miralax   Diet: NPO  CVC: none  Nikky: none  Dill: none  Restraints: none  Dispo: ICU    Critical Care Time:  40 minutes spent in preparing to see patient (I.e. review of medical records), evaluation of  diagnostics (I.e. labs, imaging, etc.), documentation, discussing plan of care with patient/ family/ caregiver, and/ or coordination of care with multidisciplinary team. Time does not include completion of procedure time.      This note was prepared using voice recognition software. The details of this note are correct and have been reviewed, and corrected to the best of my ability. Some grammatical areas may persist related to the Dragon software.     Lisa Pak APRN-CNP CCRN  Pulmonology & Critical Care Medicine  [facility]

## 2024-10-22 NOTE — ANESTHESIA POSTPROCEDURE EVALUATION
Patient: Maria Guadalupe Marks    Procedure Summary       Date: 10/22/24 Room / Location: [facility] OR 04 / Virtual [facility] OR    Anesthesia Start: 1411 Anesthesia Stop: 1551    Procedure: Arthroplasty Partial Hip (Left: Hip) Diagnosis:       Closed traumatic nondisplaced fracture of neck of left femur, initial encounter      (Closed traumatic nondisplaced fracture of neck of left femur, initial encounter [S72.002A])    Surgeons: Eligio Whitten MD Responsible Provider: Luke Nicholas MD    Anesthesia Type: general ASA Status: 4            Anesthesia Type: general    Vitals Value Taken Time   /83 10/22/24 1551   Temp 36.8 10/22/24 1551   Pulse 100 10/22/24 1551   Resp 20 10/22/24 1551   SpO2 98% 10/22/24 1551       Anesthesia Post Evaluation    Patient location during evaluation: PACU  Patient participation: complete - patient participated  Level of consciousness: awake and alert  Pain management: adequate  Multimodal analgesia pain management approach  Airway patency: patent  Cardiovascular status: acceptable  Respiratory status: acceptable  Hydration status: acceptable  Postoperative Nausea and Vomiting: none        There were no known notable events for this encounter.

## 2024-10-22 NOTE — ANESTHESIA PROCEDURE NOTES
Airway  Date/Time: 10/22/2024 2:21 PM  Urgency: elective    Airway not difficult    Staffing  Performed: CRNA   Authorized by: Humberto Kelley MD    Performed by: CONCHIS Bradley-KEVIN  Patient location during procedure: OR    Indications and Patient Condition  Indications for airway management: anesthesia and airway protection  Spontaneous Ventilation: absent  Sedation level: deep  Preoxygenated: yes  Patient position: sniffing  MILS maintained throughout  Mask difficulty assessment: 0 - not attempted  Planned trial extubation    Final Airway Details  Final airway type: endotracheal airway      Successful airway: ETT  Cuffed: yes   Successful intubation technique: video laryngoscopy  Facilitating devices/methods: intubating stylet  Endotracheal tube insertion site: oral  Blade: Alva  Blade size: #3  ETT size (mm): 7.5  Cormack-Lehane Classification: grade I - full view of glottis  Placement verified by: chest auscultation and capnometry   Cuff volume (mL): 7  Measured from: gums  ETT to gums (cm): 22  Number of attempts at approach: 1    Additional Comments  atraumatic

## 2024-10-23 LAB
ANION GAP SERPL CALC-SCNC: 10 MMOL/L (ref 10–20)
ANION GAP SERPL CALC-SCNC: 7 MMOL/L (ref 10–20)
ANION GAP SERPL CALC-SCNC: 9 MMOL/L (ref 10–20)
BASOPHILS # BLD AUTO: 0.01 X10*3/UL (ref 0–0.1)
BASOPHILS NFR BLD AUTO: 0.1 %
BNP SERPL-MCNC: 501 PG/ML (ref 0–99)
BUN SERPL-MCNC: 40 MG/DL (ref 6–23)
BUN SERPL-MCNC: 46 MG/DL (ref 6–23)
BUN SERPL-MCNC: 47 MG/DL (ref 6–23)
CALCIUM SERPL-MCNC: 8.5 MG/DL (ref 8.6–10.3)
CALCIUM SERPL-MCNC: 8.5 MG/DL (ref 8.6–10.3)
CALCIUM SERPL-MCNC: 8.9 MG/DL (ref 8.6–10.3)
CHLORIDE SERPL-SCNC: 93 MMOL/L (ref 98–107)
CHLORIDE SERPL-SCNC: 94 MMOL/L (ref 98–107)
CHLORIDE SERPL-SCNC: 97 MMOL/L (ref 98–107)
CO2 SERPL-SCNC: 39 MMOL/L (ref 21–32)
CO2 SERPL-SCNC: 40 MMOL/L (ref 21–32)
CO2 SERPL-SCNC: 42 MMOL/L (ref 21–32)
CREAT SERPL-MCNC: 1.35 MG/DL (ref 0.5–1.05)
CREAT SERPL-MCNC: 1.37 MG/DL (ref 0.5–1.05)
CREAT SERPL-MCNC: 1.39 MG/DL (ref 0.5–1.05)
CREAT SERPL-MCNC: 1.39 MG/DL (ref 0.5–1.05)
CREAT UR-MCNC: 43.4 MG/DL (ref 20–320)
EGFRCR SERPLBLD CKD-EPI 2021: 40 ML/MIN/1.73M*2
EGFRCR SERPLBLD CKD-EPI 2021: 40 ML/MIN/1.73M*2
EGFRCR SERPLBLD CKD-EPI 2021: 41 ML/MIN/1.73M*2
EGFRCR SERPLBLD CKD-EPI 2021: 41 ML/MIN/1.73M*2
EOSINOPHIL # BLD AUTO: 0 X10*3/UL (ref 0–0.4)
EOSINOPHIL NFR BLD AUTO: 0 %
ERYTHROCYTE [DISTWIDTH] IN BLOOD BY AUTOMATED COUNT: 14 % (ref 11.5–14.5)
ERYTHROCYTE [DISTWIDTH] IN BLOOD BY AUTOMATED COUNT: 14.1 % (ref 11.5–14.5)
GLUCOSE SERPL-MCNC: 129 MG/DL (ref 74–99)
GLUCOSE SERPL-MCNC: 149 MG/DL (ref 74–99)
GLUCOSE SERPL-MCNC: 179 MG/DL (ref 74–99)
HCT VFR BLD AUTO: 37.4 % (ref 36–46)
HCT VFR BLD AUTO: 37.4 % (ref 36–46)
HGB BLD-MCNC: 11.2 G/DL (ref 12–16)
HGB BLD-MCNC: 11.3 G/DL (ref 12–16)
IMM GRANULOCYTES # BLD AUTO: 0.03 X10*3/UL (ref 0–0.5)
IMM GRANULOCYTES NFR BLD AUTO: 0.3 % (ref 0–0.9)
INR PPP: 1 (ref 0.9–1.1)
INR PPP: 1 (ref 0.9–1.1)
LYMPHOCYTES # BLD AUTO: 0.48 X10*3/UL (ref 0.8–3)
LYMPHOCYTES NFR BLD AUTO: 5.6 %
MAGNESIUM SERPL-MCNC: 1.9 MG/DL (ref 1.6–2.4)
MCH RBC QN AUTO: 30.8 PG (ref 26–34)
MCH RBC QN AUTO: 30.9 PG (ref 26–34)
MCHC RBC AUTO-ENTMCNC: 29.9 G/DL (ref 32–36)
MCHC RBC AUTO-ENTMCNC: 30.2 G/DL (ref 32–36)
MCV RBC AUTO: 102 FL (ref 80–100)
MCV RBC AUTO: 103 FL (ref 80–100)
MONOCYTES # BLD AUTO: 0.76 X10*3/UL (ref 0.05–0.8)
MONOCYTES NFR BLD AUTO: 8.8 %
NEUTROPHILS # BLD AUTO: 7.33 X10*3/UL (ref 1.6–5.5)
NEUTROPHILS NFR BLD AUTO: 85.2 %
NRBC BLD-RTO: 0 /100 WBCS (ref 0–0)
NRBC BLD-RTO: 0 /100 WBCS (ref 0–0)
PLATELET # BLD AUTO: 291 X10*3/UL (ref 150–450)
PLATELET # BLD AUTO: 300 X10*3/UL (ref 150–450)
POTASSIUM SERPL-SCNC: 5.2 MMOL/L (ref 3.5–5.3)
POTASSIUM SERPL-SCNC: 5.2 MMOL/L (ref 3.5–5.3)
POTASSIUM SERPL-SCNC: 6 MMOL/L (ref 3.5–5.3)
PROTHROMBIN TIME: 11.6 SECONDS (ref 9.8–12.8)
PROTHROMBIN TIME: 11.7 SECONDS (ref 9.8–12.8)
RBC # BLD AUTO: 3.63 X10*6/UL (ref 4–5.2)
RBC # BLD AUTO: 3.67 X10*6/UL (ref 4–5.2)
SODIUM SERPL-SCNC: 137 MMOL/L (ref 136–145)
SODIUM SERPL-SCNC: 138 MMOL/L (ref 136–145)
SODIUM SERPL-SCNC: 140 MMOL/L (ref 136–145)
SODIUM UR-SCNC: 84 MMOL/L
SODIUM/CREAT UR-RTO: 194 MMOL/G CREAT
UFH PPP CHRO-ACNC: 0.1 IU/ML
UFH PPP CHRO-ACNC: 0.2 IU/ML
UFH PPP CHRO-ACNC: 0.3 IU/ML
UFH PPP CHRO-ACNC: 0.4 IU/ML
WBC # BLD AUTO: 8.6 X10*3/UL (ref 4.4–11.3)
WBC # BLD AUTO: 9.9 X10*3/UL (ref 4.4–11.3)

## 2024-10-23 PROCEDURE — 2500000004 HC RX 250 GENERAL PHARMACY W/ HCPCS (ALT 636 FOR OP/ED)

## 2024-10-23 PROCEDURE — 85610 PROTHROMBIN TIME: CPT | Performed by: NURSE PRACTITIONER

## 2024-10-23 PROCEDURE — 85520 HEPARIN ASSAY: CPT

## 2024-10-23 PROCEDURE — 83735 ASSAY OF MAGNESIUM: CPT

## 2024-10-23 PROCEDURE — 2500000005 HC RX 250 GENERAL PHARMACY W/O HCPCS

## 2024-10-23 PROCEDURE — 2500000002 HC RX 250 W HCPCS SELF ADMINISTERED DRUGS (ALT 637 FOR MEDICARE OP, ALT 636 FOR OP/ED)

## 2024-10-23 PROCEDURE — 2500000001 HC RX 250 WO HCPCS SELF ADMINISTERED DRUGS (ALT 637 FOR MEDICARE OP): Performed by: SURGERY

## 2024-10-23 PROCEDURE — 97161 PT EVAL LOW COMPLEX 20 MIN: CPT | Mod: GP

## 2024-10-23 PROCEDURE — 2500000001 HC RX 250 WO HCPCS SELF ADMINISTERED DRUGS (ALT 637 FOR MEDICARE OP)

## 2024-10-23 PROCEDURE — 99291 CRITICAL CARE FIRST HOUR: CPT

## 2024-10-23 PROCEDURE — 85025 COMPLETE CBC W/AUTO DIFF WBC: CPT

## 2024-10-23 PROCEDURE — 82570 ASSAY OF URINE CREATININE: CPT

## 2024-10-23 PROCEDURE — 82565 ASSAY OF CREATININE: CPT

## 2024-10-23 PROCEDURE — 36415 COLL VENOUS BLD VENIPUNCTURE: CPT

## 2024-10-23 PROCEDURE — 2020000001 HC ICU ROOM DAILY

## 2024-10-23 PROCEDURE — 85610 PROTHROMBIN TIME: CPT

## 2024-10-23 PROCEDURE — 94660 CPAP INITIATION&MGMT: CPT

## 2024-10-23 PROCEDURE — 83880 ASSAY OF NATRIURETIC PEPTIDE: CPT

## 2024-10-23 PROCEDURE — 80048 BASIC METABOLIC PNL TOTAL CA: CPT

## 2024-10-23 PROCEDURE — 85027 COMPLETE CBC AUTOMATED: CPT | Performed by: NURSE PRACTITIONER

## 2024-10-23 PROCEDURE — 2500000002 HC RX 250 W HCPCS SELF ADMINISTERED DRUGS (ALT 637 FOR MEDICARE OP, ALT 636 FOR OP/ED): Performed by: NURSE PRACTITIONER

## 2024-10-23 PROCEDURE — 94640 AIRWAY INHALATION TREATMENT: CPT

## 2024-10-23 PROCEDURE — 97165 OT EVAL LOW COMPLEX 30 MIN: CPT | Mod: GO

## 2024-10-23 RX ORDER — FUROSEMIDE 10 MG/ML
60 INJECTION INTRAMUSCULAR; INTRAVENOUS ONCE
Status: DISCONTINUED | OUTPATIENT
Start: 2024-10-23 | End: 2024-10-23

## 2024-10-23 RX ORDER — DEXTROSE MONOHYDRATE 100 MG/ML
50 INJECTION, SOLUTION INTRAVENOUS CONTINUOUS
Status: DISCONTINUED | OUTPATIENT
Start: 2024-10-23 | End: 2024-10-23

## 2024-10-23 RX ORDER — MONTELUKAST SODIUM 10 MG/1
10 TABLET ORAL NIGHTLY
Status: DISCONTINUED | OUTPATIENT
Start: 2024-10-23 | End: 2024-10-27 | Stop reason: HOSPADM

## 2024-10-23 RX ORDER — SODIUM BICARBONATE 1 MEQ/ML
50 SYRINGE (ML) INTRAVENOUS ONCE
Status: COMPLETED | OUTPATIENT
Start: 2024-10-23 | End: 2024-10-23

## 2024-10-23 RX ORDER — DEXTROSE 50 % IN WATER (D50W) INTRAVENOUS SYRINGE
25 ONCE
Status: COMPLETED | OUTPATIENT
Start: 2024-10-23 | End: 2024-10-23

## 2024-10-23 RX ORDER — FUROSEMIDE 10 MG/ML
60 INJECTION INTRAMUSCULAR; INTRAVENOUS EVERY 12 HOURS
Status: DISCONTINUED | OUTPATIENT
Start: 2024-10-23 | End: 2024-10-24

## 2024-10-23 ASSESSMENT — COGNITIVE AND FUNCTIONAL STATUS - GENERAL
DAILY ACTIVITIY SCORE: 13
TOILETING: TOTAL
STANDING UP FROM CHAIR USING ARMS: A LOT
PERSONAL GROOMING: A LOT
CLIMB 3 TO 5 STEPS WITH RAILING: TOTAL
MOVING TO AND FROM BED TO CHAIR: A LOT
HELP NEEDED FOR BATHING: A LOT
WALKING IN HOSPITAL ROOM: A LOT
TURNING FROM BACK TO SIDE WHILE IN FLAT BAD: A LOT
DRESSING REGULAR UPPER BODY CLOTHING: A LOT
DRESSING REGULAR LOWER BODY CLOTHING: A LOT
MOVING FROM LYING ON BACK TO SITTING ON SIDE OF FLAT BED WITH BEDRAILS: A LOT
MOBILITY SCORE: 11

## 2024-10-23 ASSESSMENT — PAIN SCALES - GENERAL
PAINLEVEL_OUTOF10: 8
PAINLEVEL_OUTOF10: 8
PAINLEVEL_OUTOF10: 5 - MODERATE PAIN
PAINLEVEL_OUTOF10: 5 - MODERATE PAIN
PAINLEVEL_OUTOF10: 4
PAINLEVEL_OUTOF10: 3
PAINLEVEL_OUTOF10: 5 - MODERATE PAIN
PAINLEVEL_OUTOF10: 3

## 2024-10-23 ASSESSMENT — PAIN DESCRIPTION - LOCATION
LOCATION: HIP
LOCATION: HIP

## 2024-10-23 ASSESSMENT — ACTIVITIES OF DAILY LIVING (ADL): BATHING_ASSISTANCE: MAXIMAL

## 2024-10-23 ASSESSMENT — PAIN DESCRIPTION - DESCRIPTORS
DESCRIPTORS: ACHING;THROBBING
DESCRIPTORS: THROBBING

## 2024-10-23 ASSESSMENT — PAIN - FUNCTIONAL ASSESSMENT
PAIN_FUNCTIONAL_ASSESSMENT: 0-10

## 2024-10-23 ASSESSMENT — PAIN DESCRIPTION - ORIENTATION
ORIENTATION: LEFT
ORIENTATION: LEFT

## 2024-10-23 NOTE — PROGRESS NOTES
10/23/24 1342   Discharge Planning   Home or Post Acute Services Post acute facilities (Rehab/SNF/etc)   Type of Post Acute Facility Services Rehab;Skilled nursing   Expected Discharge Disposition SNF   Does the patient need discharge transport arranged? Yes   RoundTrip coordination needed? Yes   Has discharge transport been arranged? No     Per Interdisciplinary rounding report with team, Pt is s/p POD #1 Left partial hip arthroplasty for non-displaced left femur fracture on 10/22/24 with Dr. Eligio Whitten. Pt is weight bearing as tolerated with posterior hip precautions, pt will DC on Coumadin-Lovenox bridge. Pts Hyperkalemia this am K 6.0, MICHAEL worsening. Will plan to give K cocktail this am. Adding 60 mg IV lasix this am and this afternoon. Continued on heparin gtt bridge to coumadin. INR continues to be subtherapeutic.  AMPAC scores are PT (11) OT (13) recommending continued therapy at moderate level/intensity. Pt is in agreement to DC to SNF for additional therapy and skilled services, requests SNF list which was hand delivered to pt. ROSALIND Le notified. CT team will monitor case for progression and DC planning.

## 2024-10-23 NOTE — PROGRESS NOTES
[facility] Critical Care Medicine       Date:  10/23/2024  Patient:  Maria Guadalupe Marks  YOB: 1950  MRN:  [id number]   Admit Date:  10/20/2024  ========================================================================================================    Chief Complaint   Patient presents with    Fall     Fall  with left hip pain +coumadin, no head injury, no LOC         History of Present Illness:  Maria Guadalupe Marks is a 74 y.o. year old female patient with Past Medical History of Afib on Warfarin, Hx of DVT, Hypertension, lumbar radiculopathy, hyperlipidemia, CKD 3a, hypercoagulable state secondary to factor V Leiden mutation, COPD was chronic respiratory failure on home oxygen 2 L/min.  Initially presented to the ED status post fall patient reported that she was getting out of her bed and fell on her left side striking her left knee and left hip.  She denies hitting her head or losing consciousness.  Patient also reported wheezing, nonproductive cough and wears 2 L nasal cannula at baseline at home.    ER course: Patient was awake, alert, oriented, no acute distress.  She was hemodynamically stable.  She was maintaining saturation well on 2 L/min her baseline.  However she does have some wheezing on auscultation and has some cough.  Her chest x-ray reported small right pleural effusion, and has a chronic interstitial coarsening suggestive of COPD/emphysema  Her labs shows mild hyperkalemia 5.4, no leukocytosis, and creatinine was 1.14 similar to baseline.  Her INR was 2.4.  Hemoglobin level was 9.5 from baseline 10.3.  Trauma imaging's did confirm acute mildly impacted left transcervical femoral neck fracture. Case discussed with orthopedic surgery, recommended holding warfarin, and possible surgery tomorrow if INR is acceptable.  Given vitamin K.    While under medicine service patient developed worsening acute on chronic hypercapnic respiratory failure with hypoxia.  She was placed on BiPAP, initial ABG shows  pH of 7.30, pCO2 of 89, PaO2 76 HCO3 43.8.  Patient is lethargic and confused.  Ultimately admitted to ICU for acute on chronic hypercapnic respiratory failure with hypoxia.    Interval ICU Events:  10/21: Admitted to ICU for acute on chronic hypercapnic respiratory failure with hypoxia.  Pending repeat ABG, pending repeat chest x-ray.  10/22: Hyperkalemia this AM, intiating treatment, meds reviewed, MICHAEL stable, check CK today. INR subtheurapeutic, verified with ORTHO initiating heparin gtt, will stop prior to OR at 10am. Otherwise stable, BiPAP overnight. Tentative OR at 1:30 today     10/23: Hyperkalemia this am K 6.0, MICHAEL worsening. Will plan to give K cocktail this am. Adding 60 mg IV lasix this am and this afternoon. Continued on heparin gtt bridge to coumadin. INR continues to be subtherapeutic.     Medical History:  Past Medical History:   Diagnosis Date    COPD (chronic obstructive pulmonary disease) (Multi)      History reviewed. No pertinent surgical history.  Medications Prior to Admission   Medication Sig Dispense Refill Last Dose/Taking    acetaminophen (Tylenol 8 HOUR) 650 mg ER tablet Take 1 tablet (650 mg) by mouth if needed.   10/20/2024    albuterol 90 mcg/actuation inhaler Inhale 2 puffs every 4 hours if needed.   10/20/2024    DULoxetine (Cymbalta) 60 mg DR capsule Take 1 capsule (60 mg) by mouth once daily. Do not crush or chew.   10/20/2024    ipratropium-albuteroL (Duo-Neb) 0.5-2.5 mg/3 mL nebulizer solution Inhale 3 mL 3 times a day.   10/20/2024    lovastatin (Mevacor) 40 mg tablet Take 1 tablet (40 mg) by mouth once daily at bedtime.   10/20/2024    magnesium oxide (Mag-Ox) 400 mg tablet Take 1.25 tablets (500 mg) by mouth once daily.   10/20/2024    metOLazone (Zaroxolyn) 2.5 mg tablet Take 1 tablet (2.5 mg) by mouth.   10/20/2024    montelukast (Singulair) 10 mg tablet Take 1 tablet (10 mg) by mouth once daily at bedtime.   10/20/2024    pramipexole (Mirapex) 0.5 mg tablet TAKE 1 TABLET  BY MOUTH FOUR TIMES DAILY AS NEEDED (RESTLESS LEG).   10/20/2024    torsemide (Demadex) 20 mg tablet 1-2  tablets daily for leg edema   10/20/2024    warfarin (Coumadin) 5 mg tablet Take 7.5 mg on MonWed and 5 mg all other days of the week or as directed by [facility].   10/20/2024    amitriptyline (Elavil) 75 mg tablet Take by mouth. (Patient not taking: Reported on 10/21/2024)   Not Taking    calcium carbonate-vitamin D3 500 mg-5 mcg (200 unit) tablet Take 1 tablet by mouth once daily.       carvedilol (Coreg) 6.25 mg tablet Take 1 tablet (6.25 mg) by mouth every 12 hours. (Patient not taking: Reported on 10/21/2024)   Not Taking    losartan (Cozaar) 50 mg tablet Take by mouth. (Patient not taking: Reported on 10/21/2024)   Not Taking    neomycin-polymyxin B-dexameth (Polydex) 3.5 mg/g-10,000 unit/g-0.1 % ointment ophthalmic ointment Apply to right eye 4 times a day. Apply 1/4 inch ribbon to right eye lid 4 times a day and at bed       nystatin (Mycostatin) 100,000 unit/gram powder Apply 1 Application topically if needed for rash.       pregabalin (Lyrica) 75 mg capsule Take 1 capsule (75 mg) by mouth 3 times a day. (Patient not taking: Reported on 10/21/2024)   Not Taking    warfarin (Coumadin) 2.5 mg tablet Take by mouth.        Morphine and Adhesive tape-silicones  Social History     Tobacco Use    Smoking status: Former     Types: Cigarettes    Smokeless tobacco: Never     No family history on file.    Review of Systems:  14 point review of systems was completed and negative except for those specially mention in my HPI    Physical Exam:    Heart Rate:  []   Temp:  [35.9 °C (96.6 °F)-36.8 °C (98.2 °F)]   Resp:  [11-29]   BP: ()/(33-83)   SpO2:  [85 %-99 %]     Physical Exam  Constitutional:       General: She is awake.      Appearance: Normal appearance.   HENT:      Head: Normocephalic and atraumatic.      Mouth/Throat:      Mouth: Mucous membranes are dry.   Cardiovascular:      Rate and  Rhythm: Normal rate and regular rhythm.      Pulses: Normal pulses.      Heart sounds: Normal heart sounds.   Pulmonary:      Effort: Pulmonary effort is normal.      Breath sounds: Examination of the right-upper field reveals wheezing. Examination of the left-upper field reveals wheezing. Examination of the right-middle field reveals wheezing. Examination of the left-middle field reveals wheezing. Examination of the right-lower field reveals wheezing. Examination of the left-lower field reveals wheezing. Wheezing present.   Abdominal:      General: Abdomen is flat. Bowel sounds are normal.      Palpations: Abdomen is soft.   Musculoskeletal:      Cervical back: Full passive range of motion without pain.      Right lower leg: No edema.      Left lower leg: No edema.      Comments: Tender to palpation left hip, with limited range of motion due to pain. Incision CDI   Neurological:      Mental Status: She is alert and oriented to person, place, and time.         Objective:    XR hip left with pelvis when performed 2 or 3 views    Result Date: 10/23/2024  Interpreted By:  Best Garcia, STUDY: XR HIP LEFT WITH PELVIS WHEN PERFORMED 2 OR 3 VIEWS;  10/22/2024 4:14 pm   INDICATION: Signs/Symptoms:post op L hip bia in RR.     COMPARISON: 10/20/2024.   ACCESSION NUMBER(S): [id number]   ORDERING CLINICIAN: NEREIDA EGAN   TECHNIQUE: Single frontal view of the left hip was obtained.   FINDINGS: Postoperative changes of recent left total hip arthroplasty are seen. Hardware components appear intact with good alignment on frontal projection. Regional soft tissue swelling and gas is present as well as a lateral skin staple line. Marginal spurring and sclerosis of the pubic symphysis is partially visualized, similar to prior.       Postoperative changes of recent left total hip arthroplasty. Intact appearing hardware components with good alignment on frontal projection.   MACRO: None.   Signed by: Best Garcia  10/23/2024 8:26 AM Dictation workstation:   [id number]    CT hip left wo IV contrast    Result Date: 10/22/2024  Interpreted By:  Heladio Beckett, STUDY: CT HIP LEFT WO IV CONTRAST; CT 3D RECONSTRUCTION; ;  10/21/2024 10:48 am; 10/21/2024 10:51 am   INDICATION: Signs/Symptoms:left femoral neck fracture.     COMPARISON: Plain film radiographs October 20   ACCESSION NUMBER(S): [id number]; [id number]   ORDERING CLINICIAN: JONATHAN GAMEZ   TECHNIQUE: Multiple thin-section axial images left hip reconstructed in the sagittal and coronal plane without contrast.   FINDINGS: Again note is made of a nondisplaced transcervical left femoral neck fracture similar to recent plain film. Slight posterior angulation noted.   No evidence of dislocation.   Osteoarthritis of the left hip with labral calcifications.   No other acute fractures are seen.   No bony lesions.   No focal fluid collections.   Gluteus medius atrophic consistent with chronic tear.   No other findings suggestive of acute tendinous tear.   Hamstring origin calcifications.   Deformity of the left parasymphyseal pubis consistent with remote healed fractures.       Nondisplaced transcervical left femoral neck fracture.   Remote left parasymphyseal pubic fractures.   Chronic tear left gluteus medius.     MACRO: None   Signed by: Heladio Beckett 10/22/2024 7:34 AM Dictation workstation:   [id number]    CT 3D reconstruction    Result Date: 10/22/2024  Interpreted By:  Heladio Beckett, STUDY: CT HIP LEFT WO IV CONTRAST; CT 3D RECONSTRUCTION; ;  10/21/2024 10:48 am; 10/21/2024 10:51 am   INDICATION: Signs/Symptoms:left femoral neck fracture.     COMPARISON: Plain film radiographs October 20   ACCESSION NUMBER(S): [id number]; [id number]   ORDERING CLINICIAN: JONATHAN GAMEZ   TECHNIQUE: Multiple thin-section axial images left hip reconstructed in the sagittal and coronal plane without contrast.   FINDINGS: Again note is made of a nondisplaced transcervical left  femoral neck fracture similar to recent plain film. Slight posterior angulation noted.   No evidence of dislocation.   Osteoarthritis of the left hip with labral calcifications.   No other acute fractures are seen.   No bony lesions.   No focal fluid collections.   Gluteus medius atrophic consistent with chronic tear.   No other findings suggestive of acute tendinous tear.   Hamstring origin calcifications.   Deformity of the left parasymphyseal pubis consistent with remote healed fractures.       Nondisplaced transcervical left femoral neck fracture.   Remote left parasymphyseal pubic fractures.   Chronic tear left gluteus medius.     MACRO: None   Signed by: Heladio Beckett 10/22/2024 7:34 AM Dictation workstation:   [id number]    XR chest 1 view    Result Date: 10/21/2024  Interpreted By:  Abby Esquivel, STUDY: XR CHEST 1 VIEW;  10/21/2024 7:20 pm   INDICATION: Signs/Symptoms:acute on chronic hypercapnic/hypoxic respiratory failure.   COMPARISON: Chest x-ray 10/20/2024.   ACCESSION NUMBER(S): [id number]   ORDERING CLINICIAN: RICHARD ROBERTS   FINDINGS: Multiple overlying leads are present.   CARDIOMEDIASTINAL SILHOUETTE: Cardiomediastinal silhouette is stable in size and configuration. Calcified mediastinal hilar lymph nodes noted. Atherosclerotic calcification of the aorta.   LUNGS: Bibasilar opacities with blunting of the costophrenic angles likely relate to layering effusions and atelectasis. Superimposed infection not excluded. No pneumothorax. Biapical pleuroparenchymal scarring. Stable mild interstitial prominence.   ABDOMEN: No remarkable upper abdominal findings.   BONES: Multilevel degenerative changes of the spine.       Cardiomegaly with mild interstitial prominence suggestive of developing interstitial edema/CHF.   Bibasilar opacities likely relate to layering effusions and atelectasis. Superimposed infection not excluded. Attention on continued follow-up is advised.   Findings suggestive of prior  granulomatous infection.   MACRO: None   Signed by: Abby Esquivel 10/21/2024 7:51 PM Dictation workstation:   [id number]    ECG 12 Lead    Result Date: 10/21/2024  Atrial fibrillation with premature ventricular or aberrantly conducted complexes Rightward axis ST & T wave abnormality, consider lateral ischemia Abnormal ECG When compared with ECG of 14-APR-2022 19:29, Atrial fibrillation has replaced Sinus rhythm Questionable change in QRS axis T wave inversion now evident in Anterior leads      Assessment/Plan:    I am currently managing this critically ill patient for the following problems:    Neuro/Psych/Pain Ctrl/Sedation:  Metabolic Encephalopathy 2/2 CO2 narcosis  Hx Lumbar Radiculopathy  See respiratory for BIPAP and repeat ABG  [facility] ICU  Delirium precautions  Continue home cymbalta     Respiratory/ENT:  Acute on chronic hypercapnic respiratory failure-resolved   COPD   wears 2L NC at baseline  Keep SPO2 greater than 90%  Q 4 hour Duonebs  40 mg IV solumedrol daiy      Cardiovascular:  Hx HTN  Hx Afib on Warfarin  Hx DVT  Heparin gtt bridge to coumadin   Currently afib with controlled rates  Continue home carvedilol, holding home torsemide  Giving 60 IV lasix BID  Daily EKG    GI:  Cardiac Diet     Renal/Volume Status (Intra & Extravascular):  Hx CKD 3  Hyperkalemia  K 6.0 this am, will give K cocktail and recheck BMP  Baseline Scr 1.10  Replete electrolytes as indicated  Daily CMP      Endocrine  No acute issues  Monitor for need for SSI, defer at this time    Infectious Disease:  No acute issues  Daily CBC  Monitor for SIRS    Heme/Onc:  Hx Factor V [place]   Current INR 1.0, continue heparin gtt and warfarin home dose and recheck coags   Cont Daily Coags   Transfuse if HGB less than 7  Daily CBC      OBGYN/MSK:  Left Femur Fx s/p Fall  S/p  Left hip fracture hemiarthroplasty 10/23  PT /OT - weight bear as tolerated, hip precautions   Optimize pain control   Orthopedics consulted- appreciate recommendations      Ethics/Code Status:  Full Code     :  DVT Prophylaxis: Heparin gtt, bridge to coumadin  GI Prophylaxis: home PPI  Bowel Regimen: Miralax   Diet: Cardiac Diet   CVC: none  [place]: none  [place]: none  Restraints: none  Dispo: ICU    Critical Care Time:  35 min    Plan Discussed with Dr. Kamar BALDERRAMA, CNP  Critical Care Medicine   [facility]

## 2024-10-23 NOTE — PROGRESS NOTES
Hip surgery    Progress Note    Subjective:     Post-Operative Day: 1 Status Post left Hip hemiarthroplasty  Systemic or Specific Complaints:No Complaints    Objective:     Visit Vitals  BP 97/57   Pulse 94   Temp 35.9 °C (96.6 °F) (Temporal)   Resp 20       General: alert and oriented, in no acute distress, appears stated age, and cooperative   Wound: no erythema, no edema, no drainage, and dressing CDI   Motion: Painful range of Motion   DVT Exam: No evidence of DVT seen on physical exam.  Negative Cortez's sign.  No significant calf/ankle edema.       NVI in lower extremity. Thigh swollen but soft. Moving foot and ankle.      Data Review  Recent Results (from the past 24 hours)   Basic metabolic panel    Collection Time: 10/22/24 10:21 AM   Result Value Ref Range    Glucose 106 (H) 74 - 99 mg/dL    Sodium 143 136 - 145 mmol/L    Potassium 5.3 3.5 - 5.3 mmol/L    Chloride 102 98 - 107 mmol/L    Bicarbonate 37 (H) 21 - 32 mmol/L    Anion Gap 9 (L) 10 - 20 mmol/L    Urea Nitrogen 33 (H) 6 - 23 mg/dL    Creatinine 0.93 0.50 - 1.05 mg/dL    eGFR 65 >60 mL/min/1.73m*2    Calcium 9.3 8.6 - 10.3 mg/dL   Creatine Kinase    Collection Time: 10/22/24 10:21 AM   Result Value Ref Range    Creatine Kinase 31 0 - 215 U/L   Coagulation Screen    Collection Time: 10/22/24  7:47 PM   Result Value Ref Range    Protime 11.8 9.8 - 12.8 seconds    INR 1.0 0.9 - 1.1    aPTT 29 27 - 38 seconds   Heparin Assay, UFH    Collection Time: 10/22/24 10:21 PM   Result Value Ref Range    Heparin Unfractionated 0.1 See Comment Below for Therapeutic Ranges IU/mL   Basic Metabolic Panel    Collection Time: 10/23/24  3:11 AM   Result Value Ref Range    Glucose 129 (H) 74 - 99 mg/dL    Sodium 140 136 - 145 mmol/L    Potassium 6.0 (H) 3.5 - 5.3 mmol/L    Chloride 97 (L) 98 - 107 mmol/L    Bicarbonate 39 (H) 21 - 32 mmol/L    Anion Gap 10 10 - 20 mmol/L    Urea Nitrogen 40 (H) 6 - 23 mg/dL    Creatinine 1.37 (H) 0.50 - 1.05 mg/dL    eGFR 41 (L) >60  mL/min/1.73m*2    Calcium 8.9 8.6 - 10.3 mg/dL   CBC and Auto Differential    Collection Time: 10/23/24  3:11 AM   Result Value Ref Range    WBC 8.6 4.4 - 11.3 x10*3/uL    nRBC 0.0 0.0 - 0.0 /100 WBCs    RBC 3.63 (L) 4.00 - 5.20 x10*6/uL    Hemoglobin 11.2 (L) 12.0 - 16.0 g/dL    Hematocrit 37.4 36.0 - 46.0 %     (H) 80 - 100 fL    MCH 30.9 26.0 - 34.0 pg    MCHC 29.9 (L) 32.0 - 36.0 g/dL    RDW 14.1 11.5 - 14.5 %    Platelets 291 150 - 450 x10*3/uL    Neutrophils % 85.2 40.0 - 80.0 %    Immature Granulocytes %, Automated 0.3 0.0 - 0.9 %    Lymphocytes % 5.6 13.0 - 44.0 %    Monocytes % 8.8 2.0 - 10.0 %    Eosinophils % 0.0 0.0 - 6.0 %    Basophils % 0.1 0.0 - 2.0 %    Neutrophils Absolute 7.33 (H) 1.60 - 5.50 x10*3/uL    Immature Granulocytes Absolute, Automated 0.03 0.00 - 0.50 x10*3/uL    Lymphocytes Absolute 0.48 (L) 0.80 - 3.00 x10*3/uL    Monocytes Absolute 0.76 0.05 - 0.80 x10*3/uL    Eosinophils Absolute 0.00 0.00 - 0.40 x10*3/uL    Basophils Absolute 0.01 0.00 - 0.10 x10*3/uL   Magnesium    Collection Time: 10/23/24  3:11 AM   Result Value Ref Range    Magnesium 1.90 1.60 - 2.40 mg/dL   Protime-INR    Collection Time: 10/23/24  3:11 AM   Result Value Ref Range    Protime 11.7 9.8 - 12.8 seconds    INR 1.0 0.9 - 1.1   Heparin Assay    Collection Time: 10/23/24  3:11 AM   Result Value Ref Range    Heparin Unfractionated 0.1 See Comment Below for Therapeutic Ranges IU/mL   B-type natriuretic peptide    Collection Time: 10/23/24  3:11 AM   Result Value Ref Range     (H) 0 - 99 pg/mL   Heparin Assay    Collection Time: 10/23/24  8:12 AM   Result Value Ref Range    Heparin Unfractionated 0.2 See Comment Below for Therapeutic Ranges IU/mL   Protime-INR    Collection Time: 10/23/24  8:12 AM   Result Value Ref Range    Protime 11.6 9.8 - 12.8 seconds    INR 1.0 0.9 - 1.1   CBC    Collection Time: 10/23/24  8:12 AM   Result Value Ref Range    WBC 9.9 4.4 - 11.3 x10*3/uL    nRBC 0.0 0.0 - 0.0 /100 WBCs     RBC 3.67 (L) 4.00 - 5.20 x10*6/uL    Hemoglobin 11.3 (L) 12.0 - 16.0 g/dL    Hematocrit 37.4 36.0 - 46.0 %     (H) 80 - 100 fL    MCH 30.8 26.0 - 34.0 pg    MCHC 30.2 (L) 32.0 - 36.0 g/dL    RDW 14.0 11.5 - 14.5 %    Platelets 300 150 - 450 x10*3/uL     XR hip left with pelvis when performed 2 or 3 views    Result Date: 10/23/2024  Interpreted By:  Best Garcia, STUDY: XR HIP LEFT WITH PELVIS WHEN PERFORMED 2 OR 3 VIEWS;  10/22/2024 4:14 pm   INDICATION: Signs/Symptoms:post op L hip bia in RR.     COMPARISON: 10/20/2024.   ACCESSION NUMBER(S): [id number]   ORDERING CLINICIAN: NEREIDA EGAN   TECHNIQUE: Single frontal view of the left hip was obtained.   FINDINGS: Postoperative changes of recent left total hip arthroplasty are seen. Hardware components appear intact with good alignment on frontal projection. Regional soft tissue swelling and gas is present as well as a lateral skin staple line. Marginal spurring and sclerosis of the pubic symphysis is partially visualized, similar to prior.       Postoperative changes of recent left total hip arthroplasty. Intact appearing hardware components with good alignment on frontal projection.   MACRO: None.   Signed by: Best Garcia 10/23/2024 8:26 AM Dictation workstation:   [id number]    CT hip left wo IV contrast    Result Date: 10/22/2024  Interpreted By:  Heladio Beckett, STUDY: CT HIP LEFT WO IV CONTRAST; CT 3D RECONSTRUCTION; ;  10/21/2024 10:48 am; 10/21/2024 10:51 am   INDICATION: Signs/Symptoms:left femoral neck fracture.     COMPARISON: Plain film radiographs October 20   ACCESSION NUMBER(S): [id number]; [id number]   ORDERING CLINICIAN: JONATHAN GAMEZ   TECHNIQUE: Multiple thin-section axial images left hip reconstructed in the sagittal and coronal plane without contrast.   FINDINGS: Again note is made of a nondisplaced transcervical left femoral neck fracture similar to recent plain film. Slight posterior angulation noted.   No  evidence of dislocation.   Osteoarthritis of the left hip with labral calcifications.   No other acute fractures are seen.   No bony lesions.   No focal fluid collections.   Gluteus medius atrophic consistent with chronic tear.   No other findings suggestive of acute tendinous tear.   Hamstring origin calcifications.   Deformity of the left parasymphyseal pubis consistent with remote healed fractures.       Nondisplaced transcervical left femoral neck fracture.   Remote left parasymphyseal pubic fractures.   Chronic tear left gluteus medius.     MACRO: None   Signed by: Heladio Beckett 10/22/2024 7:34 AM Dictation workstation:   [id number]    CT 3D reconstruction    Result Date: 10/22/2024  Interpreted By:  Heladio Beckett, STUDY: CT HIP LEFT WO IV CONTRAST; CT 3D RECONSTRUCTION; ;  10/21/2024 10:48 am; 10/21/2024 10:51 am   INDICATION: Signs/Symptoms:left femoral neck fracture.     COMPARISON: Plain film radiographs October 20   ACCESSION NUMBER(S): [id number]; [id number]   ORDERING CLINICIAN: JONATHAN GAMEZ   TECHNIQUE: Multiple thin-section axial images left hip reconstructed in the sagittal and coronal plane without contrast.   FINDINGS: Again note is made of a nondisplaced transcervical left femoral neck fracture similar to recent plain film. Slight posterior angulation noted.   No evidence of dislocation.   Osteoarthritis of the left hip with labral calcifications.   No other acute fractures are seen.   No bony lesions.   No focal fluid collections.   Gluteus medius atrophic consistent with chronic tear.   No other findings suggestive of acute tendinous tear.   Hamstring origin calcifications.   Deformity of the left parasymphyseal pubis consistent with remote healed fractures.       Nondisplaced transcervical left femoral neck fracture.   Remote left parasymphyseal pubic fractures.   Chronic tear left gluteus medius.     MACRO: None   Signed by: Heladio Beckett 10/22/2024 7:34 AM Dictation workstation:    [id number]    XR chest 1 view    Result Date: 10/21/2024  Interpreted By:  Abby Esquivel, STUDY: XR CHEST 1 VIEW;  10/21/2024 7:20 pm   INDICATION: Signs/Symptoms:acute on chronic hypercapnic/hypoxic respiratory failure.   COMPARISON: Chest x-ray 10/20/2024.   ACCESSION NUMBER(S): [id number]   ORDERING CLINICIAN: RICHARD ROBERTS   FINDINGS: Multiple overlying leads are present.   CARDIOMEDIASTINAL SILHOUETTE: Cardiomediastinal silhouette is stable in size and configuration. Calcified mediastinal hilar lymph nodes noted. Atherosclerotic calcification of the aorta.   LUNGS: Bibasilar opacities with blunting of the costophrenic angles likely relate to layering effusions and atelectasis. Superimposed infection not excluded. No pneumothorax. Biapical pleuroparenchymal scarring. Stable mild interstitial prominence.   ABDOMEN: No remarkable upper abdominal findings.   BONES: Multilevel degenerative changes of the spine.       Cardiomegaly with mild interstitial prominence suggestive of developing interstitial edema/CHF.   Bibasilar opacities likely relate to layering effusions and atelectasis. Superimposed infection not excluded. Attention on continued follow-up is advised.   Findings suggestive of prior granulomatous infection.   MACRO: None   Signed by: Abby Esquivel 10/21/2024 7:51 PM Dictation workstation:   [id number]    ECG 12 Lead    Result Date: 10/21/2024  Atrial fibrillation with premature ventricular or aberrantly conducted complexes Rightward axis ST & T wave abnormality, consider lateral ischemia Abnormal ECG When compared with ECG of 14-APR-2022 19:29, Atrial fibrillation has replaced Sinus rhythm Questionable change in QRS axis T wave inversion now evident in Anterior leads    XR shoulder left 2+ views    Result Date: 10/21/2024  Interpreted By:  Carola Oliva, STUDY: XR SHOULDER LEFT 2+ VIEWS; ;  10/20/2024 11:52 pm   INDICATION: Signs/Symptoms:fall.   COMPARISON: None.   ACCESSION NUMBER(S):  [id number]   ORDERING CLINICIAN: YAKELIN MOROCHO   FINDINGS: Two views left shoulder. No acute fracture or malalignment. Left glenohumeral osteoarthrosis noted. Several chronic left rib fractures are noted. The left lung is clear       No acute fracture or malalignment.   Glenohumeral osteoarthrosis.     MACRO: None   Signed by: Carola Oliva 10/21/2024 12:43 AM Dictation workstation:   [id number]    XR pelvis 1-2 views    Result Date: 10/21/2024  Interpreted By:  Carola Oliva, STUDY: XR PELVIS 1-2 VIEWS; XR FEMUR LEFT 2+ VIEWS; ;  10/20/2024 11:52 pm   INDICATION: Signs/Symptoms:fall.   COMPARISON: None.   ACCESSION NUMBER(S): [id number]; [id number]   ORDERING CLINICIAN: YAKELIN MOROCHO   FINDINGS: Single view pelvis and two views left femur. There is an acute, mildly impacted transcervical femoral neck fracture. Mild bilateral hip osteoarthrosis. Chronic left pubic rami and body fractures.       Acute, mildly impacted left transcervical femoral neck fracture.     MACRO: None   Signed by: Carola Oliva 10/21/2024 12:42 AM Dictation workstation:   [id number]    XR femur left 2+ views    Result Date: 10/21/2024  Interpreted By:  Carola Oliva, STUDY: XR PELVIS 1-2 VIEWS; XR FEMUR LEFT 2+ VIEWS; ;  10/20/2024 11:52 pm   INDICATION: Signs/Symptoms:fall.   COMPARISON: None.   ACCESSION NUMBER(S): [id number]; [id number]   ORDERING CLINICIAN: YAKELIN MOROCHO   FINDINGS: Single view pelvis and two views left femur. There is an acute, mildly impacted transcervical femoral neck fracture. Mild bilateral hip osteoarthrosis. Chronic left pubic rami and body fractures.       Acute, mildly impacted left transcervical femoral neck fracture.     MACRO: None   Signed by: Carola Oliva 10/21/2024 12:42 AM Dictation workstation:   [id number]    XR chest 1 view    Result Date: 10/21/2024  Interpreted By:  Carola Oliva, STUDY: XR CHEST 1 VIEW;  10/20/2024 11:52 pm   INDICATION: Signs/Symptoms:fall.   COMPARISON: 04/14/2022    ACCESSION NUMBER(S): [id number]   ORDERING CLINICIAN: YAKELIN MOROCHO   FINDINGS:     CARDIOMEDIASTINAL SILHOUETTE: Stable cardiomegaly.   LUNGS: Chronic interstitial coarsening suggesting COPD/emphysema. There is blunting of the right costophrenic angle. No pulmonary consolidation or pneumothorax.   ABDOMEN: No remarkable upper abdominal findings.   BONES: No acute osseous abnormality.       Small right pleural effusion or pleural thickening.   Chronic interstitial coarsening suggesting COPD/emphysema.   MACRO: None   Signed by: Carola Oliva 10/21/2024 12:40 AM Dictation workstation:   [id number]      Assessment:     Status Post left Hip hemiarthroplasty. Doing well postoperatively.     Acute postoperative pain: Reports mild to moderate pain to operative extremity.  Exacerbated by movement, relieved by rest ice and pain medication.  Continue current pain management.     No acute events overnight.         Plan:      1: Continues current post-op course :  2:  Continue Deep venous thrombosis prophylaxis: Coumadin lovenox bridge  3:  Continue physical therapy: WBAT with posterior hip precautions with use of walker for assistance with ambulation   4:  Continue Pain Control  5.  Discharge planning: TBD. SW and TCC following.       Sendy French, APRN-CNP

## 2024-10-23 NOTE — PROGRESS NOTES
Occupational Therapy    Evaluation    Patient Name: Maria Guadalupe Marks  MRN: [id number]  Department: [facility]  Room: 08/08-A  Today's Date: 10/23/2024  Time Calculation  Start Time: 0931  Stop Time: 0952  Time Calculation (min): 21 min      Assessment:  OT Assessment: pt. presents with poor balance, safety, decreased endurance and strength. Max A for all ADLs/transfers  Prognosis: Fair  Barriers to Discharge: Decreased caregiver support  Evaluation/Treatment Tolerance: Patient limited by fatigue, Patient limited by pain  End of Session Communication: Bedside nurse  End of Session Patient Position: Up in chair, Alarm on  OT Assessment Results: Decreased ADL status, Decreased safe judgment during ADL, Decreased endurance, Decreased functional mobility, Decreased IADLs, Decreased trunk control for functional activities  Prognosis: Fair  Barriers to Discharge: Decreased caregiver support  Evaluation/Treatment Tolerance: Patient limited by fatigue, Patient limited by pain  Plan:  Treatment Interventions: ADL retraining, Functional transfer training, Endurance training  OT Frequency: 3 times per week  OT Discharge Recommendations: Moderate intensity level of continued care  OT - OK to Discharge: Yes (when medically stable/cleared)    Subjective   Current Problem:  1. Closed traumatic nondisplaced fracture of neck of left femur, initial encounter  Case Request Operating Room: Arthroplasty Partial Hip    Case Request Operating Room: Arthroplasty Partial Hip      2. Fall, initial encounter          General:  General  Reason for Referral: ADL impairment  Referred By: OT/PT Demetria 10/22  Past Medical History Relevant to Rehab: DVT, Lumbar radiculopathy, Factor V Leinden mutation, 2L nasal cannula, COPDp, HTN, CKD, HLD  Family/Caregiver Present: No  Co-Treatment: PT  Co-Treatment Reason: to maximize pt. safety  Prior to Session Communication: Bedside nurse  Patient Position Received: Bed, 3 rail up, Alarm off, not on at start of  session  General Comment: Pt. is 73 y/o female to ED 10/21 s/p fall with L hip pain. XR femur/pelvis, CT hip showed (+) acute mildy impacted L transcervical neck fx. Orthopedics consulted; pt. underwent L hemiarthroplasty with Dr. Whitten 10/22. CT hip showed remote L parasymphyseal pubic fx, chronic tear L gluteus medius, CXR: small R pleural effusion, COPD/emphysema  Precautions:  LE Weight Bearing Status: Weight Bearing as Tolerated  Medical Precautions: Fall precautions  Post-Surgical Precautions:  (L posterior hip precautions per ortho NP note.)    Vitals:  94 HR, 97/57, 95% 02   Pain:  Pain Assessment  Pain Assessment: 0-10  0-10 (Numeric) Pain Score: 5 - Moderate pain  Pain Type: Surgical pain  Pain Location: Hip  Pain Orientation: Left  Pain Descriptors: Throbbing    Objective   Cognition:  Overall Cognitive Status: Within Functional Limits  Orientation Level: Oriented X4       Home Living:  Home Living Comments: Pt. lives with her 89 y/o uncle in one story house. Has 3 KELLEY with HR. Laundry on main floor. Has walk in shower, seat, and grab bar. Arm rests around toilet seat.  Prior Function:  Prior Function Comments: Pt. reports indep with ADLs, and cooking/med management. Has cleaning lady. Ex  lives across the street and pt. reports that he comes over to help carry laundry basket. one fall. Drove PTA.    ADL:  Eating Assistance: Independent  Grooming Assistance: Minimal  Bathing Assistance: Maximal  UE Dressing Assistance: Moderate  LE Dressing Assistance: Maximal  Toileting Assistance with Device: Total  Activity Tolerance:  Endurance: Decreased tolerance for upright activites  Activity Tolerance Comments: fatigue, pain  Bed Mobility/Transfers: Bed Mobility  Bed Mobility: Yes  Bed Mobility 1  Bed Mobility 1: Supine to sitting  Level of Assistance 1: Maximum assistance  Bed Mobility Comments 1: x2 assist to bring trunk and LEs to edge of bed. pt. extremely retro upon attempting to sit  EOB    Transfers  Transfer: Yes  Transfer 1  Technique 1: Sit to stand  Transfer Device 1: Walker  Transfer Level of Assistance 1: Maximum assistance  Trials/Comments 1: x2 to lift, weight shift forward, steady and balance.  Transfers 2  Technique 2: Stand pivot  Transfer Device 2: Walker  Transfer Level of Assistance 2: Maximum assistance  Trials/Comments 2: x2 to balance, navigate walker, max VCs for stepping sequencing. Pt places forearms on walker with forward/ flexed posture    Standing Balance:  Dynamic Standing Balance  Dynamic Standing-Comments: Poor     Vision:    and Vision - Complex Assessment  Vision Comments: Pt. states that she can't see well out of R eye    Strength:  Strength Comments: 3+/5 during functional assessment    Hand Function:  Gross Grasp: Functional  Extremities: RUE   RUE :  (Limited shoulder flexion, but WFL. Elbow/wrist/hand WNL) and LUE   LUE:  (Limited shoulder flexion, but WFL. Elbow/wrist/hand WNL)    Outcome Measures:[facility] Daily Activity  Putting on and taking off regular lower body clothing: A lot  Bathing (including washing, rinsing, drying): A lot  Putting on and taking off regular upper body clothing: A lot  Toileting, which includes using toilet, bedpan or urinal: Total  Taking care of personal grooming such as brushing teeth: A lot  Eating Meals: None  Daily Activity - Total Score: 13    Education Documentation  Body Mechanics, taught by Lucie Wilburn OT at 10/23/2024 12:44 PM.  Learner: Patient  Readiness: Acceptance  Method: Explanation  Response: Verbalizes Understanding, Needs Reinforcement    Precautions, taught by Lucie Wilburn OT at 10/23/2024 12:44 PM.  Learner: Patient  Readiness: Acceptance  Method: Explanation  Response: Verbalizes Understanding, Needs Reinforcement    ADL Training, taught by Lucie Wilburn OT at 10/23/2024 12:44 PM.  Learner: Patient  Readiness: Acceptance  Method: Explanation  Response: Verbalizes  Understanding, Needs Reinforcement    IP EDUCATION:  Education  Individual(s) Educated: Patient  Education Provided: Diagnosis & Precautions, Fall precautons, Risk and benefits of OT discussed with patient or other, POC discussed and agreed upon    Goals:  Encounter Problems       Encounter Problems (Active)       OT Goals       Fair + dyn sitting balance EOB for ADL participation (Progressing)       Start:  10/23/24    Expected End:  11/06/24            Min A for all functional transfers  (Progressing)       Start:  10/23/24    Expected End:  11/06/24            Pt. will demonstrate indep carryover of posterior hip precautions during ADLs and mobility  (Progressing)       Start:  10/23/24    Expected End:  11/06/24            Min A for LB dressing with AE (Progressing)       Start:  10/23/24    Expected End:  11/06/24

## 2024-10-23 NOTE — CARE PLAN
The patient's goals for the shift include      The clinical goals for the shift include patient will remain hemodynamically stable for duration of shift    Over the shift, the patient did not make progress toward the following goals. Barriers to progression include . Recommendations to address these barriers include .

## 2024-10-23 NOTE — PROGRESS NOTES
Physical Therapy    Physical Therapy Evaluation    Patient Name: Maria Guadalupe Marks  MRN: [id number]  Today's Date: 10/23/2024   Time Calculation  Start Time: 0932  Stop Time: 0952  Time Calculation (min): 20 min  08/08-A    Assessment/Plan   PT Assessment  PT Assessment Results: Decreased strength, Decreased endurance, Impaired balance, Decreased mobility, Decreased coordination, Pain, Orthopedic restrictions  Rehab Prognosis: Fair  Evaluation/Treatment Tolerance: Patient limited by fatigue, Patient limited by pain  Barriers to Participation: Comorbidities  End of Session Communication: Bedside nurse  Assessment Comment: pt with decreased mobility /gait strength balance endurance pt to benefit from skilled PT to address deficits and improve functional mobility .  End of Session Patient Position: Up in chair, Alarm on  IP OR SWING BED PT PLAN  Inpatient or Swing Bed: Inpatient  PT Plan  Treatment/Interventions: Bed mobility, Transfer training, Gait training, Balance training, Strengthening, Endurance training, Therapeutic exercise, Therapeutic activity, Home exercise program, Positioning  PT Plan: Ongoing PT  PT Frequency: 4 times per week  PT Discharge Recommendations: Moderate intensity level of continued care  PT Recommended Transfer Status: Assist x2, Assistive device  PT - OK to Discharge: Yes (once medically ready for discharge to next level of care.)    Subjective     Current Problem:  1. Closed traumatic nondisplaced fracture of neck of left femur, initial encounter  Case Request Operating Room: Arthroplasty Partial Hip    Case Request Operating Room: Arthroplasty Partial Hip      2. Fall, initial encounter            General Visit Information:  General  Reason for Referral: weakness/impaired mobility  Referred By: OT/PT Demetria 10/22  Past Medical History Relevant to Rehab: COPD,HTN,dyslipidemia,CKD,Afib,DVT, lumbar radiculopathy  Co-Treatment: OT  Co-Treatment Reason: to maxmize pt safety  Prior to Session  Communication: Bedside nurse (cleared to see for therapy eval)  Patient Position Received: Bed, 3 rail up, Alarm off, not on at start of session (pt has purwick 02 IV abd wedge)  General Comment: pt is a 75 yo female came to the hospital on 10/21 after a fall at home with L hip pain.  pt with L femur fx , pt underwent L hemiarthroplasty on  10/22.  WBAT L LE with post hip precautions.  test./labs : L xray :  L femoral nex fx, CXR : small R plueral effusion COPD/ emphysema,  XR shoulder : neg.  xray pelvis :  acute mildy impacted L transverse femur fx.,  Home Living:  Home Living  Home Living Comments: pt lives with her uncle in a 2 level home. 3 steps with rail to enter.  pts bed and bath on 1st floor.  tub/.shower  Prior Level of Function:  Prior Function Per Pt/Caregiver Report  Prior Function Comments: pt reports being IND with mobility  has a walker and a cane . mod I adls and assistance with iadls.  1 fall in last 3 months  still drives  Precautions:  Precautions  LE Weight Bearing Status: Weight Bearing as Tolerated  Medical Precautions: Fall precautions, Oxygen therapy device and L/min  Post-Surgical Precautions: Left hip precautions (post hip)  Vital Signs:  Vital Signs  Heart Rate: 94  SpO2: 94 %  BP: 97/57  Objective   Pain:  Pain Assessment  Pain Assessment: 0-10  0-10 (Numeric) Pain Score:  (L hip  did not rate.  states no pain at rest . hurts to move)  Cognition:  Cognition  Overall Cognitive Status: Within Functional Limits  Orientation Level: Oriented X4  General Assessments:  Activity Tolerance  Endurance: Decreased tolerance for upright activites  Sensation  Sensation Comment: intact  Strength  Strength Comments: L LE 2+/5  R LE 3+/5     Coordination  Movements are Fluid and Coordinated: No     Static Sitting Balance  Static Sitting-Comment/Number of Minutes: fair -  Dynamic Sitting Balance  Dynamic Sitting-Comments: fair -  Static Standing Balance  Static Standing-Comment/Number of Minutes:  poor  Dynamic Standing Balance  Dynamic Standing-Comments: poor  Functional Assessments:  Bed Mobility  Bed Mobility: Yes  Bed Mobility 1  Bed Mobility 1: Supine to sitting, Scooting  Level of Assistance 1: Maximum assistance, +2, Moderate verbal cues  Bed Mobility Comments 1: max A x 2 with cues to sit up pt wants to lean back . cues to bend at the waist  Transfers  Transfer: Yes  Transfer 1  Technique 1: Sit to stand, Stand to sit  Transfer Device 1: Walker  Transfer Level of Assistance 1: Maximum assistance, +2, Moderate verbal cues  Trials/Comments 1: max A x 2 with FWW cues to push up and reach back with transfers  Ambulation/Gait Training  Ambulation/Gait Training Performed: Yes  Ambulation/Gait Training 1  Surface 1: Level tile  Device 1: Rolling walker  Assistance 1: Maximum assistance  Quality of Gait 1: Decreased step length, Inconsistent stride length, Knee(s) buckle, Antalgic, Forward flexed posture  Comments/Distance (ft) 1: 3 steps to chair with max A x 2 with FWW cues to move feet and walker.  Extremity/Trunk Assessments:  RLE   RLE : Within Functional Limits  LLE   LLE :  (limited s/p L Jass arthroplasty)    Outcome Measures:     [facility] Basic Mobility  Turning from your back to your side while in a flat bed without using bedrails: A lot  Moving from lying on your back to sitting on the side of a flat bed without using bedrails: A lot  Moving to and from bed to chair (including a wheelchair): A lot  Standing up from a chair using your arms (e.g. wheelchair or bedside chair): A lot  To walk in hospital room: A lot  Climbing 3-5 steps with railing: Total  Basic Mobility - Total Score: 11  FSS-ICU  Ambulation: Walks <50 feet with any assistance x1 or walks any distance with assistance x2 people  Rolling: Maximal assistance (performs 25% - 49% of task)  Sitting: Maximal assistance (performs 25% - 49% of task)  Transfer Sit-to-Stand: Maximal assistance (performs 25% - 49% of task)  Transfer Supine-to-Sit:  Maximal assistance (performs 25% - 49% of task)  Total Score: 9  Goals:  Encounter Problems       Encounter Problems (Active)       PT Problem       Pt will demonstrate min A x 1  with bed mobility to edge of bed.   (Progressing)       Start:  10/23/24    Expected End:  11/06/24            Pt will demonstrate min A x 1  with sit to stand/chair transfers with FWW.   (Progressing)       Start:  10/23/24    Expected End:  11/06/24            Pt will ambulate 30 feet with FWW min A x 1 .   (Progressing)       Start:  10/23/24    Expected End:  11/06/24            Pt to demo improved BLE strength by being able to complete supine/seated thera ex 2x20 BLEs with 4 or less rest breaks .   (Progressing)       Start:  10/23/24    Expected End:  11/06/24               Pain - Adult            Education Documentation  Mobility Training, taught by Brandyn Geller, PT at 10/23/2024 10:45 AM.  Learner: Patient  Readiness: Acceptance  Method: Explanation  Response: Verbalizes Understanding    Education Comments  No comments found.

## 2024-10-24 ENCOUNTER — APPOINTMENT (OUTPATIENT)
Dept: RADIOLOGY | Facility: HOSPITAL | Age: 74
DRG: 521 | End: 2024-10-24
Payer: MEDICARE

## 2024-10-24 LAB
ANION GAP SERPL CALC-SCNC: 8 MMOL/L (ref 10–20)
BASOPHILS # BLD AUTO: 0 X10*3/UL (ref 0–0.1)
BASOPHILS NFR BLD AUTO: 0 %
BUN SERPL-MCNC: 48 MG/DL (ref 6–23)
CALCIUM SERPL-MCNC: 8.3 MG/DL (ref 8.6–10.3)
CHLORIDE SERPL-SCNC: 94 MMOL/L (ref 98–107)
CO2 SERPL-SCNC: 40 MMOL/L (ref 21–32)
COLLECT DURATION TIME SPEC: 24 HRS
CREAT 24H UR-MCNC: 51.7 MG/DL (ref 20–320)
CREAT 24H UR-MRATE: 0.44 G/24 H (ref 0.67–1.59)
CREAT CL 24H UR+SERPL-VRATE: 23 ML/MIN
CREAT SERPL-MCNC: 1.32 MG/DL (ref 0.5–1.05)
EGFRCR SERPLBLD CKD-EPI 2021: 42 ML/MIN/1.73M*2
EOSINOPHIL # BLD AUTO: 0.01 X10*3/UL (ref 0–0.4)
EOSINOPHIL NFR BLD AUTO: 0.1 %
ERYTHROCYTE [DISTWIDTH] IN BLOOD BY AUTOMATED COUNT: 13.6 % (ref 11.5–14.5)
GLUCOSE SERPL-MCNC: 119 MG/DL (ref 74–99)
HCT VFR BLD AUTO: 33.5 % (ref 36–46)
HGB BLD-MCNC: 10.1 G/DL (ref 12–16)
IMM GRANULOCYTES # BLD AUTO: 0.03 X10*3/UL (ref 0–0.5)
IMM GRANULOCYTES NFR BLD AUTO: 0.4 % (ref 0–0.9)
INR PPP: 1.2 (ref 0.9–1.1)
LYMPHOCYTES # BLD AUTO: 0.94 X10*3/UL (ref 0.8–3)
LYMPHOCYTES NFR BLD AUTO: 12.1 %
MAGNESIUM SERPL-MCNC: 1.83 MG/DL (ref 1.6–2.4)
MCH RBC QN AUTO: 30.7 PG (ref 26–34)
MCHC RBC AUTO-ENTMCNC: 30.1 G/DL (ref 32–36)
MCV RBC AUTO: 102 FL (ref 80–100)
MONOCYTES # BLD AUTO: 1.02 X10*3/UL (ref 0.05–0.8)
MONOCYTES NFR BLD AUTO: 13.1 %
NEUTROPHILS # BLD AUTO: 5.78 X10*3/UL (ref 1.6–5.5)
NEUTROPHILS NFR BLD AUTO: 74.3 %
NRBC BLD-RTO: 0 /100 WBCS (ref 0–0)
PLATELET # BLD AUTO: 270 X10*3/UL (ref 150–450)
POTASSIUM SERPL-SCNC: 4.8 MMOL/L (ref 3.5–5.3)
PROTHROMBIN TIME: 13.4 SECONDS (ref 9.8–12.8)
RBC # BLD AUTO: 3.29 X10*6/UL (ref 4–5.2)
SODIUM SERPL-SCNC: 137 MMOL/L (ref 136–145)
SPECIMEN VOL 24H UR: 850 ML
UFH PPP CHRO-ACNC: 0.3 IU/ML
WBC # BLD AUTO: 7.8 X10*3/UL (ref 4.4–11.3)

## 2024-10-24 PROCEDURE — 85025 COMPLETE CBC W/AUTO DIFF WBC: CPT

## 2024-10-24 PROCEDURE — 2500000001 HC RX 250 WO HCPCS SELF ADMINISTERED DRUGS (ALT 637 FOR MEDICARE OP)

## 2024-10-24 PROCEDURE — 82374 ASSAY BLOOD CARBON DIOXIDE: CPT

## 2024-10-24 PROCEDURE — 2500000002 HC RX 250 W HCPCS SELF ADMINISTERED DRUGS (ALT 637 FOR MEDICARE OP, ALT 636 FOR OP/ED)

## 2024-10-24 PROCEDURE — 97535 SELF CARE MNGMENT TRAINING: CPT | Mod: GO

## 2024-10-24 PROCEDURE — 93971 EXTREMITY STUDY: CPT | Performed by: RADIOLOGY

## 2024-10-24 PROCEDURE — 97530 THERAPEUTIC ACTIVITIES: CPT | Mod: GP,CQ

## 2024-10-24 PROCEDURE — 1100000001 HC PRIVATE ROOM DAILY

## 2024-10-24 PROCEDURE — 2500000005 HC RX 250 GENERAL PHARMACY W/O HCPCS

## 2024-10-24 PROCEDURE — 2500000004 HC RX 250 GENERAL PHARMACY W/ HCPCS (ALT 636 FOR OP/ED)

## 2024-10-24 PROCEDURE — 99232 SBSQ HOSP IP/OBS MODERATE 35: CPT | Performed by: FAMILY MEDICINE

## 2024-10-24 PROCEDURE — 99291 CRITICAL CARE FIRST HOUR: CPT

## 2024-10-24 PROCEDURE — 83735 ASSAY OF MAGNESIUM: CPT

## 2024-10-24 PROCEDURE — 85610 PROTHROMBIN TIME: CPT

## 2024-10-24 PROCEDURE — 94660 CPAP INITIATION&MGMT: CPT

## 2024-10-24 PROCEDURE — 85520 HEPARIN ASSAY: CPT

## 2024-10-24 PROCEDURE — 36415 COLL VENOUS BLD VENIPUNCTURE: CPT

## 2024-10-24 PROCEDURE — 2500000001 HC RX 250 WO HCPCS SELF ADMINISTERED DRUGS (ALT 637 FOR MEDICARE OP): Performed by: SURGERY

## 2024-10-24 PROCEDURE — 2500000004 HC RX 250 GENERAL PHARMACY W/ HCPCS (ALT 636 FOR OP/ED): Performed by: SURGERY

## 2024-10-24 PROCEDURE — 93970 EXTREMITY STUDY: CPT

## 2024-10-24 PROCEDURE — 94640 AIRWAY INHALATION TREATMENT: CPT

## 2024-10-24 PROCEDURE — 82575 CREATININE CLEARANCE TEST: CPT

## 2024-10-24 RX ORDER — CYCLOBENZAPRINE HCL 5 MG
5 TABLET ORAL 3 TIMES DAILY PRN
Start: 2024-10-24 | End: 2024-10-31

## 2024-10-24 RX ORDER — DOCUSATE SODIUM 100 MG/1
100 CAPSULE, LIQUID FILLED ORAL 2 TIMES DAILY
Start: 2024-10-24 | End: 2024-11-03

## 2024-10-24 RX ORDER — HYDROCODONE BITARTRATE AND ACETAMINOPHEN 5; 325 MG/1; MG/1
1 TABLET ORAL EVERY 6 HOURS PRN
Qty: 28 TABLET | Refills: 0 | Status: SHIPPED | OUTPATIENT
Start: 2024-10-24 | End: 2024-10-31

## 2024-10-24 RX ORDER — MAGNESIUM SULFATE HEPTAHYDRATE 40 MG/ML
2 INJECTION, SOLUTION INTRAVENOUS ONCE
Status: COMPLETED | OUTPATIENT
Start: 2024-10-24 | End: 2024-10-24

## 2024-10-24 ASSESSMENT — COGNITIVE AND FUNCTIONAL STATUS - GENERAL
PERSONAL GROOMING: A LITTLE
DAILY ACTIVITIY SCORE: 14
DRESSING REGULAR UPPER BODY CLOTHING: A LITTLE
WALKING IN HOSPITAL ROOM: A LOT
MOVING TO AND FROM BED TO CHAIR: A LOT
STANDING UP FROM CHAIR USING ARMS: A LOT
TURNING FROM BACK TO SIDE WHILE IN FLAT BAD: A LOT
HELP NEEDED FOR BATHING: A LITTLE
MOVING FROM LYING ON BACK TO SITTING ON SIDE OF FLAT BED WITH BEDRAILS: A LOT
CLIMB 3 TO 5 STEPS WITH RAILING: TOTAL
MOBILITY SCORE: 13
TOILETING: TOTAL
DRESSING REGULAR UPPER BODY CLOTHING: A LITTLE
TOILETING: A LITTLE
DRESSING REGULAR LOWER BODY CLOTHING: A LOT
MOVING FROM LYING ON BACK TO SITTING ON SIDE OF FLAT BED WITH BEDRAILS: A LOT
TURNING FROM BACK TO SIDE WHILE IN FLAT BAD: A LOT
PERSONAL GROOMING: A LITTLE
WALKING IN HOSPITAL ROOM: A LOT
STANDING UP FROM CHAIR USING ARMS: A LOT
CLIMB 3 TO 5 STEPS WITH RAILING: TOTAL
MOBILITY SCORE: 11
DAILY ACTIVITIY SCORE: 16
DAILY ACTIVITIY SCORE: 19
MOVING FROM LYING ON BACK TO SITTING ON SIDE OF FLAT BED WITH BEDRAILS: A LITTLE
HELP NEEDED FOR BATHING: A LOT
CLIMB 3 TO 5 STEPS WITH RAILING: TOTAL
MOBILITY SCORE: 11
WALKING IN HOSPITAL ROOM: A LOT
TURNING FROM BACK TO SIDE WHILE IN FLAT BAD: A LITTLE
STANDING UP FROM CHAIR USING ARMS: A LOT
DRESSING REGULAR LOWER BODY CLOTHING: A LITTLE
MOVING TO AND FROM BED TO CHAIR: A LOT
EATING MEALS: A LITTLE
MOVING TO AND FROM BED TO CHAIR: A LOT
PERSONAL GROOMING: A LITTLE
DRESSING REGULAR UPPER BODY CLOTHING: A LITTLE
DRESSING REGULAR LOWER BODY CLOTHING: A LOT
TOILETING: A LOT
HELP NEEDED FOR BATHING: A LITTLE
EATING MEALS: A LITTLE

## 2024-10-24 ASSESSMENT — PAIN - FUNCTIONAL ASSESSMENT
PAIN_FUNCTIONAL_ASSESSMENT: 0-10

## 2024-10-24 ASSESSMENT — PAIN SCALES - GENERAL
PAINLEVEL_OUTOF10: 4
PAINLEVEL_OUTOF10: 0 - NO PAIN
PAINLEVEL_OUTOF10: 0 - NO PAIN
PAINLEVEL_OUTOF10: 6
PAINLEVEL_OUTOF10: 4

## 2024-10-24 ASSESSMENT — PAIN DESCRIPTION - ORIENTATION: ORIENTATION: LEFT

## 2024-10-24 ASSESSMENT — PAIN DESCRIPTION - LOCATION: LOCATION: HIP

## 2024-10-24 ASSESSMENT — ACTIVITIES OF DAILY LIVING (ADL): HOME_MANAGEMENT_TIME_ENTRY: 15

## 2024-10-24 NOTE — PROGRESS NOTES
Maria Guadalupe Marks is a 74 y.o. female on day 3 of admission presenting with Closed traumatic nondisplaced fracture of neck of left femur, initial encounter.      Subjective   Complains of postoperative pain       Objective     Last Recorded Vitals  /51   Pulse 78   Temp 36.5 °C (97.7 °F) (Temporal)   Resp 18   Wt 72.2 kg (159 lb 2.8 oz)   SpO2 97%   Intake/Output last 3 Shifts:    Intake/Output Summary (Last 24 hours) at 10/24/2024 1459  Last data filed at 10/24/2024 1000  Gross per 24 hour   Intake 749.5 ml   Output 1300 ml   Net -550.5 ml       Admission Weight  Weight: 68 kg (150 lb) (10/20/24 2234)    Daily Weight  10/22/24 : 72.2 kg (159 lb 2.8 oz)    Image Results  Lower extremity venous duplex bilateral  Narrative: Interpreted By:  Kee Cerna,   STUDY:  [facility] LOWER EXTREMITY VENOUS DUPLEX BILATERAL;  10/24/2024 9:19 am      INDICATION:  Signs/Symptoms:left lower posterior calf pain.      ,M79.662 Pain in left lower leg      COMPARISON:  None.      ACCESSION NUMBER(S):  [id number]      ORDERING CLINICIAN:  JONATHAN GAMEZ      TECHNIQUE:  Vascular ultrasound of the bilateral lower extremities was performed.  Real-time compression views as well as Gray scale, color Doppler and  spectral Doppler waveform analysis was performed.      FINDINGS:  Evaluation of the visualized portions of the bilateral common femoral  vein, proximal, mid, and distal femoral vein, and popliteal vein were  performed.  Evaluation of the visualized portions of the calf veins  was also performed.      The right common femoral, left common femoral, and the proximal  aspect of the left femoral vein are only partially compressible and  contain nonocclusive thrombus. Please note that the popliteal veins  could not be visualized on today's examination.      The remaining evaluated veins demonstrate normal compressibility.  There is intact venous flow demonstrating normal respiratory  variability and normal augmentation of flow  with calf compression.  Therefore, there is no ultrasonographic evidence for deep vein  thrombosis within the remaining evaluated veins.      Impression: Positive for nonocclusive thrombosis of the right common femoral,  left common femoral, and proximal aspect of the left femoral veins.      MACRO:  Critical Finding:  See findings. Notification was initiated on  10/24/2024 at 9:32 am by  Kee Cerna.  (**-OCF-**)      Signed by: Kee Cerna 10/24/2024 9:32 AM  Dictation workstation:   [id number]      Physical Exam  Alert and oriented x 3  Lungs scattered wheezes  Heart regular rhythm  Abdomen soft nontender  Extremities no edema  CNS no focal deficit    Assessment/Plan      Left hip fracture status post surgical repair  Acute hypoxic and hypercapnic respiratory failure, COPD exacerbation  Metabolic encephalopathy  Factor V Leiden, acute DVT  Atrial fibrillation on warfarin  Hypertension, CKD 3 with hyperkalemia    Plan:  Warfarin was restarted with IV heparin bridging  DuoNeb, IV steroids, supplemental oxygen  PT OT evaluation and treatment, will need placement      Debi Jacinto MD

## 2024-10-24 NOTE — NURSING NOTE
1414 - Report called to RN on 6S, all questions answered. Patient informed of transfer, all belongings gathered. Will continue to monitor until patient off unit with transport.

## 2024-10-24 NOTE — PROGRESS NOTES
Occupational Therapy    OT Treatment    Patient Name: Maria Guadalupe Marks  MRN: [id number]  Department: [facility]  Room: 08/08-A  Today's Date: 10/24/2024  Time Calculation  Start Time: 1135  Stop Time: 1206  Time Calculation (min): 31 min        Assessment:  OT Assessment: Session focused on increasing IND with functional transfers and increasing overall strength/endurance. Pt tolerated session well.  Medical Staff Made Aware: Yes  End of Session Communication: Bedside nurse  End of Session Patient Position: Up in chair, Alarm off, not on at start of session  Medical Staff Made Aware: Yes  Plan:  Treatment Interventions: ADL retraining, Functional transfer training, Endurance training  OT Frequency: 3 times per week  OT Discharge Recommendations: Moderate intensity level of continued care  OT - OK to Discharge: Yes (when medically stable/cleared)  Treatment Interventions: ADL retraining, Functional transfer training, Endurance training    Subjective   Previous Visit Info:  OT Last Visit  OT Received On: 10/24/24  General:  General  Reason for Referral: self care impairment  Referred By: OT/PT Demetria 10/22  Past Medical History Relevant to Rehab: COPD,HTN,dyslipidemia,CKD,Afib,DVT, lumbar radiculopathy  Family/Caregiver Present:  (family present at beginning of session, left before treatment started)  Co-Treatment: PT  Co-Treatment Reason: to maxmize pt safety  Prior to Session Communication: Bedside nurse (cleared for therapy)  Patient Position Received: Bed, 3 rail up, Alarm off, not on at start of session (Purewick, IV, 4L O2, abductor wedge in place (removed for mobility))  General Comment: Pt pleasant, cooperative, and agreeable to treatment.  Precautions:  LE Weight Bearing Status: Weight Bearing as Tolerated  Medical Precautions: Fall precautions, Oxygen therapy device and L/min  Post-Surgical Precautions: Left hip precautions (psoterior hip precautions per ortho NP note)    Pain:  Pain Assessment  Pain Assessment:  0-10  0-10 (Numeric) Pain Score: 0 - No pain    Objective    Cognition:  Cognition  Overall Cognitive Status: Within Functional Limits  Orientation Level: Oriented X4  Coordination:     Activities of Daily Living: LE Dressing  LE Dressing: Yes  LE Dressing Comments: Pt required MAX A to thread underwear while supine in bed and MAX Ax2 to pull over hips in supported stance at FWW.  Functional Standing Tolerance:     Bed Mobility/Transfers: Bed Mobility  Bed Mobility:  (Transition from supine to sit EOB with MAX Ax2, assist managing BLE OOB and elevating trunk)    Transfers  Transfer: Yes (Sit to/from stand using FWW with MOD Ax2, cues for hand placement and technique)      Functional Mobility:  Functional Mobility  Functional Mobility Performed: Yes (Pt ambulated 3' from bed to chair using FWW with MOD Ax2)  Sitting Balance:  Static Sitting Balance  Static Sitting-Comment/Number of Minutes: fair  Standing Balance:  Static Standing Balance  Static Standing-Comment/Number of Minutes: poor+/ poor  Dynamic Standing Balance  Dynamic Standing-Comments: poor +/ poor     Outcome Measures:[facility] Daily Activity  Putting on and taking off regular lower body clothing: A lot  Bathing (including washing, rinsing, drying): A lot  Putting on and taking off regular upper body clothing: A little  Toileting, which includes using toilet, bedpan or urinal: Total  Taking care of personal grooming such as brushing teeth: A little  Eating Meals: A little  Daily Activity - Total Score: 14    Education Documentation  Body Mechanics, taught by Anita Etienne OT at 10/24/2024  2:10 PM.  Learner: Patient  Readiness: Acceptance  Method: Explanation, Demonstration  Response: Verbalizes Understanding, Demonstrated Understanding, Needs Reinforcement    Precautions, taught by Anita Etienne OT at 10/24/2024  2:10 PM.  Learner: Patient  Readiness: Acceptance  Method: Explanation, Demonstration  Response: Verbalizes Understanding, Demonstrated  Understanding, Needs Reinforcement    ADL Training, taught by Anita Etienne, OT at 10/24/2024  2:10 PM.  Learner: Patient  Readiness: Acceptance  Method: Explanation, Demonstration  Response: Verbalizes Understanding, Demonstrated Understanding, Needs Reinforcement    IP EDUCATION:  Education  Individual(s) Educated: Patient  Education Comment: Posterior hip precautions    Goals:  Encounter Problems       Encounter Problems (Active)       OT Goals       Fair + dyn sitting balance EOB for ADL participation (Progressing)       Start:  10/23/24    Expected End:  11/06/24            Min A for all functional transfers  (Progressing)       Start:  10/23/24    Expected End:  11/06/24            Pt. will demonstrate indep carryover of posterior hip precautions during ADLs and mobility  (Progressing)       Start:  10/23/24    Expected End:  11/06/24            Min A for LB dressing with AE (Progressing)       Start:  10/23/24    Expected End:  11/06/24

## 2024-10-24 NOTE — CARE PLAN
The patient's goals for the shift include      The clinical goals for the shift include Patient will remain free of injury throughout shift    Patient is progressing at goals at this time. Will continue to monitor and maintain safety.

## 2024-10-24 NOTE — PROGRESS NOTES
Physical Therapy    Physical Therapy Treatment    Patient Name: Maria Guadalupe Marks  MRN: [id number]  Today's Date: 10/24/2024  Time Calculation  Start Time: 1132  Stop Time: 1206  Time Calculation (min): 34 min     SICU-8    Assessment/Plan   PT Assessment  PT Assessment Results: Decreased strength, Decreased endurance, Impaired balance, Decreased mobility, Decreased coordination, Pain, Orthopedic restrictions  Rehab Prognosis: Good  Treatment Tolerance: Patient tolerated treatment well, Patient limited by fatigue  Medical Staff Made Aware: Yes  Barriers to Participation: Comorbidities  End of Session Communication: Bedside nurse, PCT/NA/CTA  Assessment Comment: Patient continues to require (A) for safety with bed mobility/transfers/short amb distances. Patient will benefit from additional PT to address deficits and improve mobility.  End of Session Patient Position: Up in chair, Alarm off, not on at start of session (Set up with lunch that arrived; Call light, phone, and tray table within reach.)  PT Plan  Inpatient/Swing Bed or Outpatient: Inpatient  Treatment/Interventions: Bed mobility, Transfer training, Gait training, Balance training, Strengthening, Endurance training, Therapeutic exercise, Therapeutic activity, Home exercise program, Positioning  PT Plan: Ongoing PT  PT Frequency: 4 times per week  PT Discharge Recommendations: Moderate intensity level of continued care    PT Recommended Transfer Status: Assist x2, Assistive device    General Visit Information:   PT  Visit  PT Received On: 10/24/24  General  Family/Caregiver Present: Yes (Family present and supportive; left the room at the start of session.)  Co-Treatment: OT  Co-Treatment Reason: Co-treat with OT to maximize patient and staff safety with transfers/amb 2° weakness and recent hip surgery.  Prior to Session Communication: Bedside nurse  Patient Position Received: Bed, 4 rail up  General Comment: Pleasant and cooperative.    General Observations:  "  General Observation: Tele; Multiple monitor lines; Purewick; O2; IVs.    Subjective     Precautions:  Precautions  LE Weight Bearing Status: Weight Bearing as Tolerated  Medical Precautions: Fall precautions  Post-Surgical Precautions: Left hip precautions (post hip)  Precautions Comment: (L)Hip Hemiarthroplasty(10/22/24--Betsy); posterior hip precautions    Vital Signs:  Vital Signs  Heart Rate:  (77-86bpm during session.)  SpO2:  (4L O2 via NC. SPO2 94-98% during session. No c/o SOB.)  BP:  (/65mmHg at start of session. /67mmHg at end of session.)    Objective     Pain:  Pain Assessment  Pain Assessment:  (Denies pain. \"Feels much better!\")    Cognition:  Cognition  Overall Cognitive Status: Within Functional Limits    Balance:   Static Sitting Balance  Static Sitting-Comment/Number of Minutes: F+  Dynamic Sitting Balance  Dynamic Sitting-Comments: F  Static Standing Balance  Static Standing-Comment/Number of Minutes: P+ with ww  Dynamic Standing Balance  Dynamic Standing-Comments: P+ with ww    Treatments:  Therapeutic Exercise  Therapeutic Exercise Performed: Yes  Therapeutic Exercise Activity 1: Instructed patient in supine ther ex. AROM BLE with AP/QS x10 and AAROM BLE with Abd-add x3-4 with movement towards EOB.   Bed Mobility  Bed Mobility: Yes  Bed Mobility 1  Bed Mobility 1: Supine to sitting  Level of Assistance 1: Maximum assistance, +2  Bed Mobility Comments 1: HOB ~35°. Hand over hand (A) to reach across body to use the bed rail for support. (A) to lift UB from HOB while moving LEs over the EOB. Education on hip precautions and importance of maintaining them with bed mobility.  Ambulation/Gait Training  Ambulation/Gait Training Performed: Yes  Ambulation/Gait Training 1  Surface 1: Level tile  Device 1: Rolling walker  Gait Support Devices: Gait belt  Assistance 1: Moderate assistance (x2)  Comments/Distance (ft) 1: ~3ft x1. WBOS. Slow with wt shifting. Forward flexed posture. " Decreased step height/length B. v/c and (A) for safer maneuvering of ww with transfer training. No buckling of knees with WB activities today.  Transfers  Transfer: Yes  Transfer 1  Technique 1: Sit to stand, Stand to sit  Transfer Device 1: Gait belt (ww)  Transfer Level of Assistance 1: Moderate assistance, +2  Trials/Comments 1: (2x). v/c for safe hand placement and technique. Slow transition of hands to/from ww. Patient states she normally bends forward when standing up; v/c and (A) to make sure she was not bending >90°.          Outcome Measures:     [facility] Basic Mobility  Turning from your back to your side while in a flat bed without using bedrails: A lot  Moving from lying on your back to sitting on the side of a flat bed without using bedrails: A lot  Moving to and from bed to chair (including a wheelchair): A lot  Standing up from a chair using your arms (e.g. wheelchair or bedside chair): A lot  To walk in hospital room: A lot  Climbing 3-5 steps with railing: Total  Basic Mobility - Total Score: 11    FSS-ICU 11        Education Documentation  Mobility Training, taught by Josselin Sawyer PTA at 10/24/2024  4:56 PM.  Learner: Patient  Readiness: Acceptance  Method: Explanation, Demonstration, Teach-back  Response: Verbalizes Understanding, Needs Reinforcement  Comment: See therapy note.         EDUCATION:  Individual(s) Educated: Patient  Education Provided: Body Mechanics, Fall Risk, Home Exercise Program, POC, Posture (Balance; Safety with bed mobility/transfers/amb; Posterior hip precautions.)  Patient Response to Education: Patient/Caregiver Verbalized Understanding of Information, Patient/Caregiver Performed Return Demonstration of Exercises/Activities, Patient/Caregiver Asked Appropriate Questions    Encounter Problems       Encounter Problems (Active)       PT Problem       Pt will demonstrate min A x 1  with bed mobility to edge of bed.   (Progressing)       Start:  10/23/24    Expected End:   11/06/24            Pt will demonstrate min A x 1  with sit to stand/chair transfers with FWW.   (Progressing)       Start:  10/23/24    Expected End:  11/06/24            Pt will ambulate 30 feet with FWW min A x 1 .   (Progressing)       Start:  10/23/24    Expected End:  11/06/24            Pt to demo improved BLE strength by being able to complete supine/seated thera ex 2x20 BLEs with 4 or less rest breaks .   (Progressing)       Start:  10/23/24    Expected End:  11/06/24

## 2024-10-24 NOTE — DISCHARGE INSTRUCTIONS
Total Hip Replacement   Discharge Instructions    To prevent Clot formation, you have been placed on the following medication:  Coumadin resumed. Please continue to take as you were taking prior to surgery.   Surgical Site Care:  .Change dressing once a day and PRN (as needed). Apply 4 x 4 sponge and light tape. If glue present, leave open to air.  You may leave wound open to air after initial dressing removal, if wound is clean, dry and intact  If Aquacel Ag dressing is present, do not remove dressing for 7 days, unless heavily saturated. If heavily saturated, remove dressing and start using instructions above  Staples will be removed on post-operative day 14 and steri-strips applied  Showering is permitted starting POD1 if waterproof Aquacel dressing is present or when the incision is covered with 4 x 4 and Tegaderm waterproof dressing  Until all areas of incision are healed.    Physical Therapy:  Weight Bearing Status:  Weight-bearing as tolerated  Posterior Hip precautions, per therapy handout   Pain Medications  You were given  oxycodone  Wean off pain medications as you deem appropriate as long as pain is under control  Cold packs/Ice packs/Machine  May be used 3 times daily for 15-30 minutes as necessary  Be sure to have a barrier (cloth, clothing, towel) between the site and the ice pack to prevent frostbite  Contact [facility] office if  Increased redness, swelling, drainage of any kind, and/or pain to surgery site.  As well as new onset fevers and or chills.  These could signify an infection.  Calf or thigh tenderness to touch as well as increased swelling or redness.  This could signify a clot formation.  Numbness or tingling to an area around the incision site or below the incision site (toes).  Any rash appears, increased  or new onset nausea/vomiting occur.  This may indicate a reaction to a medication.  Phone # [phone].  Follow up with Surgeon   I acknowledge that I have received  marshal hose and understand the instructions on how and when to wear them (on during the day, off at night)   Discharging RN who has gone over instructions and acknowledges marshal hose have been received    Ice 5 times a day for 20 minutes each session to operative extremity for two weeks.   MARSHAL hose to be worn for three weeks. Can be removed for skin care and hygiene.   Incentive spirometer 10 times every hour while awake for two weeks.

## 2024-10-24 NOTE — PROGRESS NOTES
Spoke with pt. To obtain snf facility choice.  She is requesting 1. [facility] 2. [facility] as her dtr. Lives in [place] and prefers this area.  Referral sent to [facility].  Pt. With [employer] ins., will require ins. Precert.    Update:  [facility] pt.  Hospital ins. Team to obtain precert.

## 2024-10-24 NOTE — PROGRESS NOTES
Hip surgery    Progress Note    Subjective:     Post-Operative Day: 2 Status Post left Hip hemiarthroplasty  Systemic or Specific Complaints:No Complaints    Objective:     Visit Vitals  /56   Pulse 80   Temp 36.5 °C (97.7 °F)   Resp 22       General: alert and oriented, in no acute distress, appears stated age, and cooperative   Wound: no erythema, no edema, no drainage, and dressing CDI   Motion: Painful range of Motion   DVT Exam: Posterior calf tenderness present. Left lower extremity        NVI in lower extremity. Thigh swollen but soft. Moving foot and ankle.      Data Review  Recent Results (from the past 24 hours)   Heparin Assay    Collection Time: 10/23/24  8:12 AM   Result Value Ref Range    Heparin Unfractionated 0.2 See Comment Below for Therapeutic Ranges IU/mL   Protime-INR    Collection Time: 10/23/24  8:12 AM   Result Value Ref Range    Protime 11.6 9.8 - 12.8 seconds    INR 1.0 0.9 - 1.1   CBC    Collection Time: 10/23/24  8:12 AM   Result Value Ref Range    WBC 9.9 4.4 - 11.3 x10*3/uL    nRBC 0.0 0.0 - 0.0 /100 WBCs    RBC 3.67 (L) 4.00 - 5.20 x10*6/uL    Hemoglobin 11.3 (L) 12.0 - 16.0 g/dL    Hematocrit 37.4 36.0 - 46.0 %     (H) 80 - 100 fL    MCH 30.8 26.0 - 34.0 pg    MCHC 30.2 (L) 32.0 - 36.0 g/dL    RDW 14.0 11.5 - 14.5 %    Platelets 300 150 - 450 x10*3/uL   Sodium, Urine Random    Collection Time: 10/23/24  8:30 AM   Result Value Ref Range    Sodium, Urine Random 84 mmol/L    Creatinine, Urine Random 43.4 20.0 - 320.0 mg/dL    Sodium/Creatinine Ratio 194 Not established. mmol/g Creat   Creatinine    Collection Time: 10/23/24  9:20 AM   Result Value Ref Range    Creatinine 1.35 (H) 0.50 - 1.05 mg/dL    eGFR 41 (L) >60 mL/min/1.73m*2   Basic Metabolic Panel    Collection Time: 10/23/24  2:10 PM   Result Value Ref Range    Glucose 149 (H) 74 - 99 mg/dL    Sodium 138 136 - 145 mmol/L    Potassium 5.2 3.5 - 5.3 mmol/L    Chloride 94 (L) 98 - 107 mmol/L    Bicarbonate 40 (HH)  21 - 32 mmol/L    Anion Gap 9 (L) 10 - 20 mmol/L    Urea Nitrogen 46 (H) 6 - 23 mg/dL    Creatinine 1.39 (H) 0.50 - 1.05 mg/dL    eGFR 40 (L) >60 mL/min/1.73m*2    Calcium 8.5 (L) 8.6 - 10.3 mg/dL   Heparin Assay, UFH    Collection Time: 10/23/24  2:10 PM   Result Value Ref Range    Heparin Unfractionated 0.4 See Comment Below for Therapeutic Ranges IU/mL   Heparin Assay, UFH    Collection Time: 10/23/24  5:56 PM   Result Value Ref Range    Heparin Unfractionated 0.3 See Comment Below for Therapeutic Ranges IU/mL   Basic metabolic panel    Collection Time: 10/23/24  7:24 PM   Result Value Ref Range    Glucose 179 (H) 74 - 99 mg/dL    Sodium 137 136 - 145 mmol/L    Potassium 5.2 3.5 - 5.3 mmol/L    Chloride 93 (L) 98 - 107 mmol/L    Bicarbonate 42 (HH) 21 - 32 mmol/L    Anion Gap 7 (L) 10 - 20 mmol/L    Urea Nitrogen 47 (H) 6 - 23 mg/dL    Creatinine 1.39 (H) 0.50 - 1.05 mg/dL    eGFR 40 (L) >60 mL/min/1.73m*2    Calcium 8.5 (L) 8.6 - 10.3 mg/dL   Basic Metabolic Panel    Collection Time: 10/24/24  4:18 AM   Result Value Ref Range    Glucose 119 (H) 74 - 99 mg/dL    Sodium 137 136 - 145 mmol/L    Potassium 4.8 3.5 - 5.3 mmol/L    Chloride 94 (L) 98 - 107 mmol/L    Bicarbonate 40 (HH) 21 - 32 mmol/L    Anion Gap 8 (L) 10 - 20 mmol/L    Urea Nitrogen 48 (H) 6 - 23 mg/dL    Creatinine 1.32 (H) 0.50 - 1.05 mg/dL    eGFR 42 (L) >60 mL/min/1.73m*2    Calcium 8.3 (L) 8.6 - 10.3 mg/dL   CBC and Auto Differential    Collection Time: 10/24/24  4:18 AM   Result Value Ref Range    WBC 7.8 4.4 - 11.3 x10*3/uL    nRBC 0.0 0.0 - 0.0 /100 WBCs    RBC 3.29 (L) 4.00 - 5.20 x10*6/uL    Hemoglobin 10.1 (L) 12.0 - 16.0 g/dL    Hematocrit 33.5 (L) 36.0 - 46.0 %     (H) 80 - 100 fL    MCH 30.7 26.0 - 34.0 pg    MCHC 30.1 (L) 32.0 - 36.0 g/dL    RDW 13.6 11.5 - 14.5 %    Platelets 270 150 - 450 x10*3/uL    Neutrophils % 74.3 40.0 - 80.0 %    Immature Granulocytes %, Automated 0.4 0.0 - 0.9 %    Lymphocytes % 12.1 13.0 - 44.0 %     Monocytes % 13.1 2.0 - 10.0 %    Eosinophils % 0.1 0.0 - 6.0 %    Basophils % 0.0 0.0 - 2.0 %    Neutrophils Absolute 5.78 (H) 1.60 - 5.50 x10*3/uL    Immature Granulocytes Absolute, Automated 0.03 0.00 - 0.50 x10*3/uL    Lymphocytes Absolute 0.94 0.80 - 3.00 x10*3/uL    Monocytes Absolute 1.02 (H) 0.05 - 0.80 x10*3/uL    Eosinophils Absolute 0.01 0.00 - 0.40 x10*3/uL    Basophils Absolute 0.00 0.00 - 0.10 x10*3/uL   Magnesium    Collection Time: 10/24/24  4:18 AM   Result Value Ref Range    Magnesium 1.83 1.60 - 2.40 mg/dL   Protime-INR    Collection Time: 10/24/24  4:18 AM   Result Value Ref Range    Protime 13.4 (H) 9.8 - 12.8 seconds    INR 1.2 (H) 0.9 - 1.1   Heparin Assay, UFH    Collection Time: 10/24/24  4:18 AM   Result Value Ref Range    Heparin Unfractionated 0.3 See Comment Below for Therapeutic Ranges IU/mL     XR hip left with pelvis when performed 2 or 3 views    Result Date: 10/23/2024  Interpreted By:  Best Garcia, STUDY: XR HIP LEFT WITH PELVIS WHEN PERFORMED 2 OR 3 VIEWS;  10/22/2024 4:14 pm   INDICATION: Signs/Symptoms:post op L hip bia in RR.     COMPARISON: 10/20/2024.   ACCESSION NUMBER(S): [id number]   ORDERING CLINICIAN: NEREIDA EGAN   TECHNIQUE: Single frontal view of the left hip was obtained.   FINDINGS: Postoperative changes of recent left total hip arthroplasty are seen. Hardware components appear intact with good alignment on frontal projection. Regional soft tissue swelling and gas is present as well as a lateral skin staple line. Marginal spurring and sclerosis of the pubic symphysis is partially visualized, similar to prior.       Postoperative changes of recent left total hip arthroplasty. Intact appearing hardware components with good alignment on frontal projection.   MACRO: None.   Signed by: Best Garcia 10/23/2024 8:26 AM Dictation workstation:   [id number]    CT hip left wo IV contrast    Result Date: 10/22/2024  Interpreted By:  Heladio Beckett, STUDY: CT HIP  LEFT WO IV CONTRAST; CT 3D RECONSTRUCTION; ;  10/21/2024 10:48 am; 10/21/2024 10:51 am   INDICATION: Signs/Symptoms:left femoral neck fracture.     COMPARISON: Plain film radiographs October 20   ACCESSION NUMBER(S): [id number]; [id number]   ORDERING CLINICIAN: JONATHAN GAMEZ   TECHNIQUE: Multiple thin-section axial images left hip reconstructed in the sagittal and coronal plane without contrast.   FINDINGS: Again note is made of a nondisplaced transcervical left femoral neck fracture similar to recent plain film. Slight posterior angulation noted.   No evidence of dislocation.   Osteoarthritis of the left hip with labral calcifications.   No other acute fractures are seen.   No bony lesions.   No focal fluid collections.   Gluteus medius atrophic consistent with chronic tear.   No other findings suggestive of acute tendinous tear.   Hamstring origin calcifications.   Deformity of the left parasymphyseal pubis consistent with remote healed fractures.       Nondisplaced transcervical left femoral neck fracture.   Remote left parasymphyseal pubic fractures.   Chronic tear left gluteus medius.     MACRO: None   Signed by: Heladio Beckett 10/22/2024 7:34 AM Dictation workstation:   [id number]    CT 3D reconstruction    Result Date: 10/22/2024  Interpreted By:  Heladio Beckett, STUDY: CT HIP LEFT WO IV CONTRAST; CT 3D RECONSTRUCTION; ;  10/21/2024 10:48 am; 10/21/2024 10:51 am   INDICATION: Signs/Symptoms:left femoral neck fracture.     COMPARISON: Plain film radiographs October 20   ACCESSION NUMBER(S): [id number]; [id number]   ORDERING CLINICIAN: JONATHAN GAMEZ   TECHNIQUE: Multiple thin-section axial images left hip reconstructed in the sagittal and coronal plane without contrast.   FINDINGS: Again note is made of a nondisplaced transcervical left femoral neck fracture similar to recent plain film. Slight posterior angulation noted.   No evidence of dislocation.   Osteoarthritis of the left hip with labral  calcifications.   No other acute fractures are seen.   No bony lesions.   No focal fluid collections.   Gluteus medius atrophic consistent with chronic tear.   No other findings suggestive of acute tendinous tear.   Hamstring origin calcifications.   Deformity of the left parasymphyseal pubis consistent with remote healed fractures.       Nondisplaced transcervical left femoral neck fracture.   Remote left parasymphyseal pubic fractures.   Chronic tear left gluteus medius.     MACRO: None   Signed by: Heladio Beckett 10/22/2024 7:34 AM Dictation workstation:   [id number]    XR chest 1 view    Result Date: 10/21/2024  Interpreted By:  Abby Esquivel, STUDY: XR CHEST 1 VIEW;  10/21/2024 7:20 pm   INDICATION: Signs/Symptoms:acute on chronic hypercapnic/hypoxic respiratory failure.   COMPARISON: Chest x-ray 10/20/2024.   ACCESSION NUMBER(S): [id number]   ORDERING CLINICIAN: RICHARD ROBERTS   FINDINGS: Multiple overlying leads are present.   CARDIOMEDIASTINAL SILHOUETTE: Cardiomediastinal silhouette is stable in size and configuration. Calcified mediastinal hilar lymph nodes noted. Atherosclerotic calcification of the aorta.   LUNGS: Bibasilar opacities with blunting of the costophrenic angles likely relate to layering effusions and atelectasis. Superimposed infection not excluded. No pneumothorax. Biapical pleuroparenchymal scarring. Stable mild interstitial prominence.   ABDOMEN: No remarkable upper abdominal findings.   BONES: Multilevel degenerative changes of the spine.       Cardiomegaly with mild interstitial prominence suggestive of developing interstitial edema/CHF.   Bibasilar opacities likely relate to layering effusions and atelectasis. Superimposed infection not excluded. Attention on continued follow-up is advised.   Findings suggestive of prior granulomatous infection.   MACRO: None   Signed by: Abby Esquivel 10/21/2024 7:51 PM Dictation workstation:   [id number]    ECG 12 Lead    Result Date:  10/21/2024  Atrial fibrillation with premature ventricular or aberrantly conducted complexes Rightward axis ST & T wave abnormality, consider lateral ischemia Abnormal ECG When compared with ECG of 14-APR-2022 19:29, Atrial fibrillation has replaced Sinus rhythm Questionable change in QRS axis T wave inversion now evident in Anterior leads    XR shoulder left 2+ views    Result Date: 10/21/2024  Interpreted By:  Carola Oliva, STUDY: XR SHOULDER LEFT 2+ VIEWS; ;  10/20/2024 11:52 pm   INDICATION: Signs/Symptoms:fall.   COMPARISON: None.   ACCESSION NUMBER(S): [id number]   ORDERING CLINICIAN: YAKELIN MOROCHO   FINDINGS: Two views left shoulder. No acute fracture or malalignment. Left glenohumeral osteoarthrosis noted. Several chronic left rib fractures are noted. The left lung is clear       No acute fracture or malalignment.   Glenohumeral osteoarthrosis.     MACRO: None   Signed by: Carola Oliva 10/21/2024 12:43 AM Dictation workstation:   [id number]    XR pelvis 1-2 views    Result Date: 10/21/2024  Interpreted By:  Carola Oliva, STUDY: XR PELVIS 1-2 VIEWS; XR FEMUR LEFT 2+ VIEWS; ;  10/20/2024 11:52 pm   INDICATION: Signs/Symptoms:fall.   COMPARISON: None.   ACCESSION NUMBER(S): [id number]; [id number]   ORDERING CLINICIAN: YAKELIN MOROCHO   FINDINGS: Single view pelvis and two views left femur. There is an acute, mildly impacted transcervical femoral neck fracture. Mild bilateral hip osteoarthrosis. Chronic left pubic rami and body fractures.       Acute, mildly impacted left transcervical femoral neck fracture.     MACRO: None   Signed by: Carola Oliva 10/21/2024 12:42 AM Dictation workstation:   [id number]    XR femur left 2+ views    Result Date: 10/21/2024  Interpreted By:  Carola Oliva, STUDY: XR PELVIS 1-2 VIEWS; XR FEMUR LEFT 2+ VIEWS; ;  10/20/2024 11:52 pm   INDICATION: Signs/Symptoms:fall.   COMPARISON: None.   ACCESSION NUMBER(S): [id number]; [id number]   ORDERING CLINICIAN: YAKELIN MOROCHO    FINDINGS: Single view pelvis and two views left femur. There is an acute, mildly impacted transcervical femoral neck fracture. Mild bilateral hip osteoarthrosis. Chronic left pubic rami and body fractures.       Acute, mildly impacted left transcervical femoral neck fracture.     MACRO: None   Signed by: Carola Oliva 10/21/2024 12:42 AM Dictation workstation:   [id number]    XR chest 1 view    Result Date: 10/21/2024  Interpreted By:  Carola Oliva, STUDY: XR CHEST 1 VIEW;  10/20/2024 11:52 pm   INDICATION: Signs/Symptoms:fall.   COMPARISON: 04/14/2022   ACCESSION NUMBER(S): [id number]   ORDERING CLINICIAN: YAKELIN MOROCHO   FINDINGS:     CARDIOMEDIASTINAL SILHOUETTE: Stable cardiomegaly.   LUNGS: Chronic interstitial coarsening suggesting COPD/emphysema. There is blunting of the right costophrenic angle. No pulmonary consolidation or pneumothorax.   ABDOMEN: No remarkable upper abdominal findings.   BONES: No acute osseous abnormality.       Small right pleural effusion or pleural thickening.   Chronic interstitial coarsening suggesting COPD/emphysema.   MACRO: None   Signed by: Carola Oliva 10/21/2024 12:40 AM Dictation workstation:   [id number]      Assessment:     Status Post left Hip hemiarthroplasty. Doing well postoperatively.     Acute postoperative pain: Reports mild to moderate pain to operative extremity.  Exacerbated by movement, relieved by rest ice and pain medication.  Continue current pain management.    INR this am 1.2. Continues on coumadin heparin bridge.   She complains of left posterior calf pain. Will order venous duplex to rule out DVT.     Addendum 0942: results of venous duplex show Positive for nonocclusive thrombosis of the right common femoral,  left common femoral, and proximal aspect of the left femoral veins.    Critical care team made aware of these results. She is currently on heparin gtt and coumadin. Will continue with these medications.     Plan:      1: Continues current  post-op course :  2:  Continue Deep venous thrombosis prophylaxis: coumadin heparin bridge. INR this am 1.2 Continues to be subtherapeutic. Will continue to monitor daily heparin and INR.   3:  Continue physical therapy: WBAT with posterior hip precautions with use of walker for assistance with ambulation.   4:  Continue Pain Control  5.  Discharge planning: TBD. SW and TCC following.       Sendy French, APRN-CNP

## 2024-10-24 NOTE — PROGRESS NOTES
[facility] Critical Care Medicine       Date:  10/24/2024  Patient:  Maria Guadalupe Marks  YOB: 1950  MRN:  [id number]   Admit Date:  10/20/2024  ========================================================================================================    Chief Complaint   Patient presents with    Fall     Fall  with left hip pain +coumadin, no head injury, no LOC         History of Present Illness:  Maria Guadalupe Marks is a 74 y.o. year old female patient with Past Medical History of Afib on Warfarin, Hx of DVT, Hypertension, lumbar radiculopathy, hyperlipidemia, CKD 3a, hypercoagulable state secondary to factor V Leiden mutation, COPD was chronic respiratory failure on home oxygen 2 L/min.  Initially presented to the ED status post fall patient reported that she was getting out of her bed and fell on her left side striking her left knee and left hip.  She denies hitting her head or losing consciousness.  Patient also reported wheezing, nonproductive cough and wears 2 L nasal cannula at baseline at home.    ER course: Patient was awake, alert, oriented, no acute distress.  She was hemodynamically stable.  She was maintaining saturation well on 2 L/min her baseline.  However she does have some wheezing on auscultation and has some cough.  Her chest x-ray reported small right pleural effusion, and has a chronic interstitial coarsening suggestive of COPD/emphysema  Her labs shows mild hyperkalemia 5.4, no leukocytosis, and creatinine was 1.14 similar to baseline.  Her INR was 2.4.  Hemoglobin level was 9.5 from baseline 10.3.  Trauma imaging's did confirm acute mildly impacted left transcervical femoral neck fracture. Case discussed with orthopedic surgery, recommended holding warfarin, and possible surgery tomorrow if INR is acceptable.  Given vitamin K.    While under medicine service patient developed worsening acute on chronic hypercapnic respiratory failure with hypoxia.  She was placed on BiPAP, initial ABG shows  pH of 7.30, pCO2 of 89, PaO2 76 HCO3 43.8.  Patient is lethargic and confused.  Ultimately admitted to ICU for acute on chronic hypercapnic respiratory failure with hypoxia.    Interval ICU Events:  10/21: Admitted to ICU for acute on chronic hypercapnic respiratory failure with hypoxia.  Pending repeat ABG, pending repeat chest x-ray.  10/22: Hyperkalemia this AM, intiating treatment, meds reviewed, MICHAEL stable, check CK today. INR subtheurapeutic, verified with ORTHO initiating heparin gtt, will stop prior to OR at 10am. Otherwise stable, BiPAP overnight. Tentative OR at 1:30 today     10/23: Hyperkalemia this am K 6.0, MICHAEL worsening. Will plan to give K cocktail this am. Adding 60 mg IV lasix this am and this afternoon. Continued on heparin gtt bridge to coumadin. INR continues to be subtherapeutic.     10/24: K WNL this am. HDS, HR is NSR with no ectopy. Renal function stable this am. Continued on heparin bridge to coumadin. INR 1.2 this am. DVT studies pending    Update: DVT study positive for nonocclusive thrombosis of the right common femoral,  left common femoral, and proximal aspect of the left femoral veins.    Medical History:  Past Medical History:   Diagnosis Date    COPD (chronic obstructive pulmonary disease) (Multi)      History reviewed. No pertinent surgical history.  Medications Prior to Admission   Medication Sig Dispense Refill Last Dose/Taking    acetaminophen (Tylenol 8 HOUR) 650 mg ER tablet Take 1 tablet (650 mg) by mouth if needed.   10/20/2024    albuterol 90 mcg/actuation inhaler Inhale 2 puffs every 4 hours if needed.   10/20/2024    DULoxetine (Cymbalta) 60 mg DR capsule Take 1 capsule (60 mg) by mouth once daily. Do not crush or chew.   10/20/2024    ipratropium-albuteroL (Duo-Neb) 0.5-2.5 mg/3 mL nebulizer solution Inhale 3 mL 3 times a day.   10/20/2024    lovastatin (Mevacor) 40 mg tablet Take 1 tablet (40 mg) by mouth once daily at bedtime.   10/20/2024    magnesium oxide (Mag-Ox)  400 mg tablet Take 1.25 tablets (500 mg) by mouth once daily.   10/20/2024    metOLazone (Zaroxolyn) 2.5 mg tablet Take 1 tablet (2.5 mg) by mouth.   10/20/2024    montelukast (Singulair) 10 mg tablet Take 1 tablet (10 mg) by mouth once daily at bedtime.   10/20/2024    pramipexole (Mirapex) 0.5 mg tablet TAKE 1 TABLET BY MOUTH FOUR TIMES DAILY AS NEEDED (RESTLESS LEG).   10/20/2024    torsemide (Demadex) 20 mg tablet 1-2  tablets daily for leg edema   10/20/2024    warfarin (Coumadin) 5 mg tablet Take 7.5 mg on MonWed and 5 mg all other days of the week or as directed by Coumadin Clinic.   10/20/2024    amitriptyline (Elavil) 75 mg tablet Take by mouth. (Patient not taking: Reported on 10/21/2024)   Not Taking    calcium carbonate-vitamin D3 500 mg-5 mcg (200 unit) tablet Take 1 tablet by mouth once daily.       carvedilol (Coreg) 6.25 mg tablet Take 1 tablet (6.25 mg) by mouth every 12 hours. (Patient not taking: Reported on 10/21/2024)   Not Taking    losartan (Cozaar) 50 mg tablet Take by mouth. (Patient not taking: Reported on 10/21/2024)   Not Taking    neomycin-polymyxin B-dexameth (Polydex) 3.5 mg/g-10,000 unit/g-0.1 % ointment ophthalmic ointment Apply to right eye 4 times a day. Apply 1/4 inch ribbon to right eye lid 4 times a day and at bed       nystatin (Mycostatin) 100,000 unit/gram powder Apply 1 Application topically if needed for rash.       pregabalin (Lyrica) 75 mg capsule Take 1 capsule (75 mg) by mouth 3 times a day. (Patient not taking: Reported on 10/21/2024)   Not Taking    warfarin (Coumadin) 2.5 mg tablet Take by mouth.        Morphine and Adhesive tape-silicones  Social History     Tobacco Use    Smoking status: Former     Types: Cigarettes    Smokeless tobacco: Never     No family history on file.    Review of Systems:  14 point review of systems was completed and negative except for those specially mention in my HPI    Physical Exam:    Heart Rate:  [76-98]   Temp:  [35.8 °C (96.4  °F)-36.5 °C (97.7 °F)]   Resp:  [16-40]   BP: ()/(47-78)   SpO2:  [91 %-100 %]     Physical Exam  Constitutional:       General: She is awake.      Appearance: Normal appearance.   HENT:      Head: Normocephalic and atraumatic.      Mouth/Throat:      Mouth: Mucous membranes are dry.   Cardiovascular:      Rate and Rhythm: Normal rate and regular rhythm.      Pulses: Normal pulses.      Heart sounds: Normal heart sounds.   Pulmonary:      Effort: Pulmonary effort is normal.      Breath sounds: Examination of the right-upper field reveals wheezing. Examination of the left-upper field reveals wheezing. Examination of the right-middle field reveals wheezing. Examination of the left-middle field reveals wheezing. Examination of the right-lower field reveals wheezing. Examination of the left-lower field reveals wheezing. Wheezing present.   Abdominal:      General: Abdomen is flat. Bowel sounds are normal.      Palpations: Abdomen is soft.   Musculoskeletal:      Cervical back: Full passive range of motion without pain.      Right lower leg: No edema.      Left lower leg: No edema.      Comments: Tender to palpation left hip, with limited range of motion due to pain. Incision CDI   Neurological:      Mental Status: She is alert and oriented to person, place, and time.         Objective:    XR hip left with pelvis when performed 2 or 3 views    Result Date: 10/23/2024  Interpreted By:  Best Garcia, STUDY: XR HIP LEFT WITH PELVIS WHEN PERFORMED 2 OR 3 VIEWS;  10/22/2024 4:14 pm   INDICATION: Signs/Symptoms:post op L hip bia in RR.     COMPARISON: 10/20/2024.   ACCESSION NUMBER(S): [id number]   ORDERING CLINICIAN: NEREIDA EGAN   TECHNIQUE: Single frontal view of the left hip was obtained.   FINDINGS: Postoperative changes of recent left total hip arthroplasty are seen. Hardware components appear intact with good alignment on frontal projection. Regional soft tissue swelling and gas is present as well as a  lateral skin staple line. Marginal spurring and sclerosis of the pubic symphysis is partially visualized, similar to prior.       Postoperative changes of recent left total hip arthroplasty. Intact appearing hardware components with good alignment on frontal projection.   MACRO: None.   Signed by: Best Garcia 10/23/2024 8:26 AM Dictation workstation:   [id number]      Assessment/Plan:    I am currently managing this critically ill patient for the following problems:    Neuro/Psych/Pain Ctrl/Sedation:  Metabolic Encephalopathy 2/2 CO2 narcosis  Hx Lumbar Radiculopathy  See respiratory for BIPAP and repeat ABG  CAM ICU  Delirium precautions  Continue home cymbalta     Respiratory/ENT:  Acute on chronic hypercapnic respiratory failure-resolved   COPD   wears 2L NC at baseline  Keep SPO2 greater than 90%  Q 4 hour Duonebs  40 mg IV solumedrol daily      Cardiovascular:  Hx HTN  Hx Afib on Warfarin  DVT (10/24)  Positive for nonocclusive thrombosis of the right common femoral,  left common femoral, and proximal aspect of the left femoral veins  Heparin gtt bridge to coumadin   Currently afib with controlled rates  Continue home carvedilol and torsemide  Daily EKG    GI:  Cardiac Diet     Renal/Volume Status (Intra & Extravascular):  Hx CKD 3  Hyperkalemia-resolved  Baseline Scr 1.1-12, Scr 1.32 this am near baseline  Replete electrolytes as indicated  Daily CMP      Endocrine  No acute issues  Monitor for need for SSI, defer at this time    Infectious Disease:  No acute issues  Daily CBC  Monitor for SIRS    Heme/Onc:  Hx Factor V Leyden   Current INR 1.2, continue heparin gtt and warfarin home dose and recheck coags   Cont Daily Coags   Transfuse if HGB less than 7  Daily CBC      OBGYN/MSK:  Left Femur Fx s/p Fall  S/p  Left hip fracture hemiarthroplasty 10/23  PT /OT - weight bear as tolerated, hip precautions   Optimize pain control   Orthopedics consulted- appreciate recommendations     Ethics/Code  Status:  Full Code     :  DVT Prophylaxis: Heparin gtt, bridge to coumadin  GI Prophylaxis: home PPI  Bowel Regimen: Miralax   Diet: Cardiac Diet   CVC: none  Morris: none  Dill: none  Restraints: none  Dispo: Transfer to Medicine service     Critical Care Time:  35 min    Plan Discussed with Dr. Kamar BALDERRAMA, CNP  Critical Care Medicine   [facility]

## 2024-10-25 LAB
ANION GAP SERPL CALC-SCNC: 10 MMOL/L (ref 10–20)
BASOPHILS # BLD AUTO: 0.01 X10*3/UL (ref 0–0.1)
BASOPHILS NFR BLD AUTO: 0.1 %
BUN SERPL-MCNC: 52 MG/DL (ref 6–23)
CALCIUM SERPL-MCNC: 8.5 MG/DL (ref 8.6–10.3)
CHLORIDE SERPL-SCNC: 94 MMOL/L (ref 98–107)
CO2 SERPL-SCNC: 40 MMOL/L (ref 21–32)
CREAT SERPL-MCNC: 1.06 MG/DL (ref 0.5–1.05)
EGFRCR SERPLBLD CKD-EPI 2021: 55 ML/MIN/1.73M*2
EOSINOPHIL # BLD AUTO: 0.01 X10*3/UL (ref 0–0.4)
EOSINOPHIL NFR BLD AUTO: 0.1 %
ERYTHROCYTE [DISTWIDTH] IN BLOOD BY AUTOMATED COUNT: 13.5 % (ref 11.5–14.5)
GLUCOSE SERPL-MCNC: 109 MG/DL (ref 74–99)
HCT VFR BLD AUTO: 31.5 % (ref 36–46)
HGB BLD-MCNC: 9.6 G/DL (ref 12–16)
IMM GRANULOCYTES # BLD AUTO: 0.05 X10*3/UL (ref 0–0.5)
IMM GRANULOCYTES NFR BLD AUTO: 0.7 % (ref 0–0.9)
INR PPP: 1.6 (ref 0.9–1.1)
LYMPHOCYTES # BLD AUTO: 0.38 X10*3/UL (ref 0.8–3)
LYMPHOCYTES NFR BLD AUTO: 5.5 %
MAGNESIUM SERPL-MCNC: 2.19 MG/DL (ref 1.6–2.4)
MCH RBC QN AUTO: 30.4 PG (ref 26–34)
MCHC RBC AUTO-ENTMCNC: 30.5 G/DL (ref 32–36)
MCV RBC AUTO: 100 FL (ref 80–100)
MONOCYTES # BLD AUTO: 0.57 X10*3/UL (ref 0.05–0.8)
MONOCYTES NFR BLD AUTO: 8.2 %
NEUTROPHILS # BLD AUTO: 5.93 X10*3/UL (ref 1.6–5.5)
NEUTROPHILS NFR BLD AUTO: 85.4 %
NRBC BLD-RTO: 0 /100 WBCS (ref 0–0)
PLATELET # BLD AUTO: 228 X10*3/UL (ref 150–450)
POTASSIUM SERPL-SCNC: 5.5 MMOL/L (ref 3.5–5.3)
PROTHROMBIN TIME: 18.6 SECONDS (ref 9.8–12.8)
RBC # BLD AUTO: 3.16 X10*6/UL (ref 4–5.2)
SODIUM SERPL-SCNC: 138 MMOL/L (ref 136–145)
UFH PPP CHRO-ACNC: 0.2 IU/ML
UFH PPP CHRO-ACNC: 0.2 IU/ML
UFH PPP CHRO-ACNC: 0.4 IU/ML
WBC # BLD AUTO: 7 X10*3/UL (ref 4.4–11.3)

## 2024-10-25 PROCEDURE — 2500000002 HC RX 250 W HCPCS SELF ADMINISTERED DRUGS (ALT 637 FOR MEDICARE OP, ALT 636 FOR OP/ED)

## 2024-10-25 PROCEDURE — 36415 COLL VENOUS BLD VENIPUNCTURE: CPT

## 2024-10-25 PROCEDURE — 2500000004 HC RX 250 GENERAL PHARMACY W/ HCPCS (ALT 636 FOR OP/ED)

## 2024-10-25 PROCEDURE — 97530 THERAPEUTIC ACTIVITIES: CPT | Mod: GP,CQ

## 2024-10-25 PROCEDURE — 36415 COLL VENOUS BLD VENIPUNCTURE: CPT | Performed by: FAMILY MEDICINE

## 2024-10-25 PROCEDURE — 94660 CPAP INITIATION&MGMT: CPT

## 2024-10-25 PROCEDURE — 83735 ASSAY OF MAGNESIUM: CPT

## 2024-10-25 PROCEDURE — 99231 SBSQ HOSP IP/OBS SF/LOW 25: CPT

## 2024-10-25 PROCEDURE — 2500000001 HC RX 250 WO HCPCS SELF ADMINISTERED DRUGS (ALT 637 FOR MEDICARE OP)

## 2024-10-25 PROCEDURE — 97110 THERAPEUTIC EXERCISES: CPT | Mod: GP,CQ

## 2024-10-25 PROCEDURE — 85610 PROTHROMBIN TIME: CPT

## 2024-10-25 PROCEDURE — 94640 AIRWAY INHALATION TREATMENT: CPT

## 2024-10-25 PROCEDURE — 2500000005 HC RX 250 GENERAL PHARMACY W/O HCPCS

## 2024-10-25 PROCEDURE — 99233 SBSQ HOSP IP/OBS HIGH 50: CPT | Performed by: FAMILY MEDICINE

## 2024-10-25 PROCEDURE — 1100000001 HC PRIVATE ROOM DAILY

## 2024-10-25 PROCEDURE — 85025 COMPLETE CBC W/AUTO DIFF WBC: CPT

## 2024-10-25 PROCEDURE — 80048 BASIC METABOLIC PNL TOTAL CA: CPT

## 2024-10-25 PROCEDURE — 85520 HEPARIN ASSAY: CPT | Performed by: FAMILY MEDICINE

## 2024-10-25 ASSESSMENT — PAIN SCALES - GENERAL
PAINLEVEL_OUTOF10: 0 - NO PAIN
PAINLEVEL_OUTOF10: 2
PAINLEVEL_OUTOF10: 6
PAINLEVEL_OUTOF10: 0 - NO PAIN

## 2024-10-25 ASSESSMENT — COGNITIVE AND FUNCTIONAL STATUS - GENERAL
TURNING FROM BACK TO SIDE WHILE IN FLAT BAD: A LOT
MOVING TO AND FROM BED TO CHAIR: A LOT
MOBILITY SCORE: 11
PERSONAL GROOMING: A LITTLE
DAILY ACTIVITIY SCORE: 16
HELP NEEDED FOR BATHING: A LITTLE
EATING MEALS: A LITTLE
MOVING FROM LYING ON BACK TO SITTING ON SIDE OF FLAT BED WITH BEDRAILS: A LOT
MOVING FROM LYING ON BACK TO SITTING ON SIDE OF FLAT BED WITH BEDRAILS: A LOT
EATING MEALS: A LITTLE
MOVING FROM LYING ON BACK TO SITTING ON SIDE OF FLAT BED WITH BEDRAILS: A LOT
TURNING FROM BACK TO SIDE WHILE IN FLAT BAD: A LOT
WALKING IN HOSPITAL ROOM: A LOT
DRESSING REGULAR LOWER BODY CLOTHING: A LOT
MOBILITY SCORE: 11
WALKING IN HOSPITAL ROOM: A LOT
PERSONAL GROOMING: A LITTLE
CLIMB 3 TO 5 STEPS WITH RAILING: TOTAL
STANDING UP FROM CHAIR USING ARMS: A LOT
DRESSING REGULAR LOWER BODY CLOTHING: A LOT
HELP NEEDED FOR BATHING: A LITTLE
MOBILITY SCORE: 11
MOVING TO AND FROM BED TO CHAIR: A LOT
TURNING FROM BACK TO SIDE WHILE IN FLAT BAD: A LOT
STANDING UP FROM CHAIR USING ARMS: A LOT
DAILY ACTIVITIY SCORE: 16
STANDING UP FROM CHAIR USING ARMS: A LOT
DRESSING REGULAR UPPER BODY CLOTHING: A LITTLE
WALKING IN HOSPITAL ROOM: A LOT
DRESSING REGULAR UPPER BODY CLOTHING: A LITTLE
MOVING TO AND FROM BED TO CHAIR: A LOT
CLIMB 3 TO 5 STEPS WITH RAILING: TOTAL
TOILETING: A LOT
TOILETING: A LOT
CLIMB 3 TO 5 STEPS WITH RAILING: TOTAL

## 2024-10-25 ASSESSMENT — PAIN DESCRIPTION - LOCATION: LOCATION: KNEE

## 2024-10-25 ASSESSMENT — PAIN DESCRIPTION - ORIENTATION: ORIENTATION: LEFT

## 2024-10-25 ASSESSMENT — PAIN - FUNCTIONAL ASSESSMENT: PAIN_FUNCTIONAL_ASSESSMENT: 0-10

## 2024-10-25 NOTE — PROGRESS NOTES
10/25/24 1518   Discharge Planning   Home or Post Acute Services Post acute facilities (Rehab/SNF/etc)   Type of Post Acute Facility Services Rehab;Skilled nursing   Expected Discharge Disposition SNF   Does the patient need discharge transport arranged? Yes   RoundTrip coordination needed? Yes   Has discharge transport been arranged? No   What day is the transport expected? 10/26/24     Notified by Team that SNF auth for [facility] received and good dates 10/25-10/31/24. Attending notified and states no DC today, will recheck INR tomorrow, possible Saturday DC. CT team will continue monitoring case progression for DC planning.

## 2024-10-25 NOTE — NURSING NOTE
This nurse informed Doctor Ophelia of patient's daughters concerns on patient's INR level. No new orders were placed.

## 2024-10-25 NOTE — PROGRESS NOTES
Maria Guadalupe Marks is a 74 y.o. female on day 4 of admission presenting with Closed traumatic nondisplaced fracture of neck of left femur, initial encounter.      Subjective   Complains of postoperative pain  Reports improvement in shortness of breath, she is on 4 L now     Objective     Last Recorded Vitals  /63 (BP Location: Left arm, Patient Position: Lying)   Pulse 80   Temp 36.7 °C (98.1 °F) (Temporal)   Resp 18   Wt 72.2 kg (159 lb 2.8 oz)   SpO2 92%   Intake/Output last 3 Shifts:    Intake/Output Summary (Last 24 hours) at 10/25/2024 1313  Last data filed at 10/25/2024 0123  Gross per 24 hour   Intake 244.25 ml   Output --   Net 244.25 ml       Admission Weight  Weight: 68 kg (150 lb) (10/20/24 2234)    Daily Weight  10/22/24 : 72.2 kg (159 lb 2.8 oz)    Image Results  Lower extremity venous duplex bilateral  Narrative: Interpreted By:  Kee Cerna,   STUDY:  [facility] LOWER EXTREMITY VENOUS DUPLEX BILATERAL;  10/24/2024 9:19 am      INDICATION:  Signs/Symptoms:left lower posterior calf pain.      ,M79.662 Pain in left lower leg      COMPARISON:  None.      ACCESSION NUMBER(S):  [id number]      ORDERING CLINICIAN:  JONATHAN GAMEZ      TECHNIQUE:  Vascular ultrasound of the bilateral lower extremities was performed.  Real-time compression views as well as Gray scale, color Doppler and  spectral Doppler waveform analysis was performed.      FINDINGS:  Evaluation of the visualized portions of the bilateral common femoral  vein, proximal, mid, and distal femoral vein, and popliteal vein were  performed.  Evaluation of the visualized portions of the calf veins  was also performed.      The right common femoral, left common femoral, and the proximal  aspect of the left femoral vein are only partially compressible and  contain nonocclusive thrombus. Please note that the popliteal veins  could not be visualized on today's examination.      The remaining evaluated veins demonstrate normal  compressibility.  There is intact venous flow demonstrating normal respiratory  variability and normal augmentation of flow with calf compression.  Therefore, there is no ultrasonographic evidence for deep vein  thrombosis within the remaining evaluated veins.      Impression: Positive for nonocclusive thrombosis of the right common femoral,  left common femoral, and proximal aspect of the left femoral veins.      MACRO:  Critical Finding:  See findings. Notification was initiated on  10/24/2024 at 9:32 am by  Kee Cerna.  (**-OCF-**)      Signed by: Kee Cerna 10/24/2024 9:32 AM  Dictation workstation:   [id number]      Physical Exam  Alert and oriented x 3  Lungs scattered wheezes  Heart regular rhythm  Abdomen soft nontender  Extremities no edema  CNS no focal deficit    Assessment/Plan      Left hip fracture status post surgical repair  Acute hypoxic and hypercapnic respiratory failure, COPD exacerbation  Metabolic encephalopathy  Factor V Leiden, acute DVT  Atrial fibrillation on warfarin  Hypertension, CKD 3 with hyperkalemia    Plan:  Warfarin was restarted with IV heparin bridging  DuoNeb, IV steroids, supplemental oxygen  PT OT evaluation and treatment, will need placement    Debi Jacinto MD

## 2024-10-25 NOTE — PROGRESS NOTES
Physical Therapy    Physical Therapy Treatment    Patient Name: Maria Guadalupe Marks  MRN: [id number]  Today's Date: 10/25/2024  Time Calculation  Start Time: 0830  Stop Time: 0910  Time Calculation (min): 40 min     622/622-A    Assessment/Plan   PT Assessment  PT Assessment Results: Decreased strength, Decreased endurance, Impaired balance, Decreased mobility, Decreased coordination, Pain, Orthopedic restrictions  Rehab Prognosis: Good  Treatment Tolerance: Patient tolerated treatment well, Patient limited by fatigue, Patient limited by pain  Medical Staff Made Aware: Yes  Barriers to Participation: Comorbidities  End of Session Communication: Bedside nurse, PCT/NA/CTA  Assessment Comment: Patient continues to require (A) for safety with bed mobility/transfers/short amb distances. Patient will benefit from additional PT to address deficits and improve mobility.  End of Session Patient Position: Up in chair, Alarm on (Reclined in chair with LEs elevated; Call light, phone, and tray table within reach.)  PT Plan  Inpatient/Swing Bed or Outpatient: Inpatient  Treatment/Interventions: Bed mobility, Transfer training, Gait training, Balance training, Strengthening, Endurance training, Therapeutic exercise, Therapeutic activity, Home exercise program, Positioning  PT Plan: Ongoing PT  PT Frequency: 4 times per week  PT Discharge Recommendations: Moderate intensity level of continued care    PT Recommended Transfer Status: Assist x2, Assistive device    General Visit Information:   PT  Visit  PT Received On: 10/25/24  General  Family/Caregiver Present: No  Co-Treatment: OT  Co-Treatment Reason: Co-treat with OT to maximize patient and staff safety with transfers/amb.  Prior to Session Communication: Bedside nurse  Patient Position Received: Bed, 3 rail up, Alarm on  General Comment: Pleasant and cooperative.    General Observations:   General Observation: Tele; Purewick; IV; O2 via NC; Alarm.    Subjective      Precautions:  Precautions  LE Weight Bearing Status: Weight Bearing as Tolerated  Medical Precautions: Fall precautions  Precautions Comment: (L)Hip Hemiarthroplasty(10/22/24--Betsy); posterior hip precautions    Vital Signs:  Vital Signs  Heart Rate:  (102-109bpm during session.)  SpO2:  (4L O2 via NC. (+)MUÑOZ. SPO2 83-84% after transfer to BSC. SPO2 91% after ~2min rest and pursed lip breathing. SPO2 87-88% after transfer to chair. SPO2 90% after ~2min rest and pursed lip breathing. Productive coughing episodes with activities.)  BP:  (/65mmHg at start of session. /67mmHg at end of session.) Nursing aware of readings.    Objective     Pain:  Pain Assessment  Pain Assessment:  (5/10(L)hip pain at rest. 7/10 (L)hip pain with WB/activities. Ice pack for (L)hip at end of session. Nursing aware.)    Cognition:  Cognition  Overall Cognitive Status: Within Functional Limits    Balance:   Static Sitting Balance  Static Sitting-Comment/Number of Minutes: F+  Dynamic Sitting Balance  Dynamic Sitting-Comments: F  Static Standing Balance  Static Standing-Comment/Number of Minutes: P+ with ww  Dynamic Standing Balance  Dynamic Standing-Comments: P+ with ww    Treatments:  Therapeutic Exercise  Therapeutic Exercise Performed: Yes  Therapeutic Exercise Activity 1: Instructed patient in supine ther ex. AROM BLE with AP/QS/GS x12. Instructed patient in seated ther ex. AROM BLE with AP/LAQ/Hipflex x12. No c/o pain with exercises.   Bed Mobility  Bed Mobility: Yes  Bed Mobility 1  Bed Mobility 1: Supine to sitting  Level of Assistance 1: Moderate assistance  Bed Mobility Comments 1: HOB ~30°. Hand over hand (A) to reach across body to use the bed rail for support. (A) to lift UB from HOB while moving LEs over the EOB. Education on posterior hip precautions.  Ambulation/Gait Training  Ambulation/Gait Training Performed: Yes  Ambulation/Gait Training 1  Surface 1: Level tile  Device 1: Rolling walker  Gait Support  Devices: Gait belt  Assistance 1: Moderate assistance  Comments/Distance (ft) 1: ~3ft x2 Bed to BSC to Chair. WBOS. Slow with wt shifting. Increased time provided for patient to process instructions for movement. Forward flexed posture. Decreased step height/length B. v/c and (A) for safer maneuvering of ww with transfers. (+)MUÑOZ; productive coughing episodes with activities.   Transfers  Transfer: Yes  Transfer 1  Technique 1: Sit to stand, Stand to sit  Transfer Device 1: Gait belt (ww)  Transfer Level of Assistance 1: Moderate assistance  Trials/Comments 1: (3x). v/c for safe hand placement and technique. Slow transition of hands to/from ww. Benefits from elevated surfaces.          Outcome Measures:     [facility] Basic Mobility  Turning from your back to your side while in a flat bed without using bedrails: A lot  Moving from lying on your back to sitting on the side of a flat bed without using bedrails: A lot  Moving to and from bed to chair (including a wheelchair): A lot  Standing up from a chair using your arms (e.g. wheelchair or bedside chair): A lot  To walk in hospital room: A lot  Climbing 3-5 steps with railing: Total  Basic Mobility - Total Score: 11           Education Documentation  Mobility Training, taught by Josselin Sawyer PTA at 10/25/2024 12:45 PM.  Learner: Patient  Readiness: Acceptance  Method: Explanation, Demonstration, Teach-back  Response: Verbalizes Understanding, Needs Reinforcement  Comment: See therapy note.           EDUCATION:  Individual(s) Educated: Patient  Education Provided: Body Mechanics, Fall Risk, Home Exercise Program, POC, Posture (Balance; Safety with bed mobility/transfers/amb; Posterior hip precautions.)  Patient Response to Education: Patient/Caregiver Verbalized Understanding of Information, Patient/Caregiver Performed Return Demonstration of Exercises/Activities, Patient/Caregiver Asked Appropriate Questions    Encounter Problems       Encounter Problems (Active)        PT Problem       Pt will demonstrate min A x 1  with bed mobility to edge of bed.   (Progressing)       Start:  10/23/24    Expected End:  11/06/24            Pt will demonstrate min A x 1  with sit to stand/chair transfers with FWW.   (Progressing)       Start:  10/23/24    Expected End:  11/06/24            Pt will ambulate 30 feet with FWW min A x 1 .   (Progressing)       Start:  10/23/24    Expected End:  11/06/24            Pt to demo improved BLE strength by being able to complete supine/seated thera ex 2x20 BLEs with 4 or less rest breaks .   (Progressing)       Start:  10/23/24    Expected End:  11/06/24

## 2024-10-25 NOTE — CONSULTS
Reason For Consult  Evaluation of acute/chronic hypoxic/hypercapnic respiratory failure, COPD exacerbation, atelectasis and various pulmonary issues.    History of Present Illness:  Maria Guadalupe Marks is a 74 y.o. year old female patient with Past Medical History of Afib on Warfarin, Hx of DVT, Hypertension, lumbar radiculopathy, hyperlipidemia, CKD 3a, hypercoagulable state secondary to factor V Leiden mutation, COPD was chronic respiratory failure on home oxygen 2 L/min.  Initially presented to the ED status post fall patient reported that she was getting out of her bed and fell on her left side striking her left knee and left hip.  She denies hitting her head or losing consciousness.  Patient also reported wheezing, nonproductive cough and wears 2 L nasal cannula at baseline at home.     ER course: Patient was awake, alert, oriented, no acute distress.  She was hemodynamically stable.  She was maintaining saturation well on 2 L/min her baseline.  However she does have some wheezing on auscultation and has some cough.  Her chest x-ray reported small right pleural effusion, and has a chronic interstitial coarsening suggestive of COPD/emphysema  Her labs shows mild hyperkalemia 5.4, no leukocytosis, and creatinine was 1.14 similar to baseline.  Her INR was 2.4.  Hemoglobin level was 9.5 from baseline 10.3.  Trauma imaging's did confirm acute mildly impacted left transcervical femoral neck fracture. Case discussed with orthopedic surgery, recommended holding warfarin, and possible surgery tomorrow if INR is acceptable.  Given vitamin K.     While under medicine service patient developed worsening acute on chronic hypercapnic respiratory failure with hypoxia.  She was placed on BiPAP, initial ABG shows pH of 7.30, pCO2 of 89, PaO2 76 HCO3 43.8.  Patient is lethargic and confused.  Ultimately admitted to ICU for acute on chronic hypercapnic respiratory failure with hypoxia.       Interval ICU Events:  10/21: Admitted to  ICU for acute on chronic hypercapnic respiratory failure with hypoxia.  Pending repeat ABG, pending repeat chest x-ray.  10/22: Hyperkalemia this AM, intiating treatment, meds reviewed, MICHAEL stable, check CK today. INR subtheurapeutic, verified with ORTHO initiating heparin gtt, will stop prior to OR at 10am. Otherwise stable, BiPAP overnight. Tentative OR at 1:30 today      10/23: Hyperkalemia this am K 6.0, MICHAEL worsening. Will plan to give K cocktail this am. Adding 60 mg IV lasix this am and this afternoon. Continued on heparin gtt bridge to coumadin. INR continues to be subtherapeutic.      10/24: K WNL this am. HDS, HR is NSR with no ectopy. Renal function stable this am. Continued on heparin bridge to coumadin. INR 1.2 this am. DVT studies pending       I was asked to evaluate and follow from a pulmonary perspective.  On admission, patient developed acute on chronic hypoxic/hypercapnic respiratory failure requiring ICU stay as well as BiPAP 20/12.  Patient was stabilized,  given IV steroids and went to the OR on 10/22/2024.  The patient was given heparin bridge for surgery as patient is noted to have hypercoagulable status due to factor V Leyden deficiency.  Subsequently patient has improved transferred to the floor continues to be on heparin bridge and started on Coumadin.  INR is still on the low side.  She continues to be short of breath and hypoxic requiring oxygen 3 to 4 L.  She is apparently on home O2 2 L nasal cannula.  She appears to have severe COPD and appears to be on generic DuoNebs 3 times daily and albuterol MDI as needed.  She has longstanding prior smoking apparently quit smoking couple of months ago.  She denies any history of known sleep apnea.    Past Medical History  A-fib  History of DVT  Hypertension  COPD with chronic respiratory failure on home oxygen 2 L/min  Lumbar radiculopathy  Hyperlipidemia  Hypercoagulable state  Factor V Leyden mutation     Surgical History  She has no past  surgical history on file.     Social History  She has no history on file for tobacco use, alcohol use, and drug use.     Family History  Family History   No family history on file.        Allergies  Morphine     Review of Systems  10/10 points review of system were conducted, negative except as above     Physical Exam  Constitutional:       Appearance: She is not ill-appearing or diaphoretic.   HENT:      Head: Normocephalic and atraumatic.      Nose: Nose normal.      Mouth/Throat:      Mouth: Mucous membranes are moist.   Eyes:      Extraocular Movements: Extraocular movements intact.      Conjunctiva/sclera: Conjunctivae normal.      Pupils: Pupils are equal, round, and reactive to light.   Neck:      Vascular: No carotid bruit.   Cardiovascular:      Rate and Rhythm: Normal rate. Rhythm irregular.      Heart sounds: No murmur heard.  Pulmonary:      Breath sounds: Wheezing, rhonchi and rales present.   Abdominal:      General: There is no distension.      Palpations: There is no mass.      Tenderness: There is no abdominal tenderness. There is no right CVA tenderness, left CVA tenderness, guarding or rebound.      Hernia: No hernia is present.   Musculoskeletal:      Cervical back: Normal range of motion and neck supple. No tenderness.      Right lower leg: Edema present.      Left lower leg: Edema present.      Comments: Tender to palpation left hip, with limited range of motion due to pain.   Skin:     General: Skin is warm and dry.      Coloration: Skin is not jaundiced or pale.      Findings: No rash.   Neurological:      General: No focal deficit present.      Mental Status: She is alert and oriented to person, place, and time. Mental status is at baseline.   Psychiatric:         Mood and Affect: Mood normal.         Behavior: Behavior normal.            Last Recorded Vitals  Blood pressure 112/71, pulse 86, temperature 36.8 °C (98.2 °F), temperature source Temporal, resp. rate (!) 21, height 1.524 m (5'),  weight 68 kg (150 lb), SpO2 (!) 93%.     Relevant Results  Scheduled medications    Scheduled Medications   carvedilol, 6.25 mg, oral, BID  pantoprazole, 40 mg, oral, Daily   Or  pantoprazole, 40 mg, intravenous, Daily  polyethylene glycol, 17 g, oral, Daily  pregabalin, 75 mg, oral, BID  torsemide, 20 mg, oral, Daily         Continuous medications  Continuous Medications         PRN medications  PRN Medications   PRN medications: acetaminophen **OR** acetaminophen **OR** acetaminophen, HYDROcodone-acetaminophen, ipratropium-albuteroL, melatonin, ondansetron **OR** ondansetron               Results for orders placed or performed during the hospital encounter of 10/20/24 (from the past 24 hours)   CBC and Auto Differential   Result Value Ref Range     WBC 6.1 4.4 - 11.3 x10*3/uL     nRBC 0.0 0.0 - 0.0 /100 WBCs     RBC 3.07 (L) 4.00 - 5.20 x10*6/uL     Hemoglobin 9.5 (L) 12.0 - 16.0 g/dL     Hematocrit 32.2 (L) 36.0 - 46.0 %      (H) 80 - 100 fL     MCH 30.9 26.0 - 34.0 pg     MCHC 29.5 (L) 32.0 - 36.0 g/dL     RDW 14.3 11.5 - 14.5 %     Platelets 226 150 - 450 x10*3/uL     Neutrophils % 73.7 40.0 - 80.0 %     Immature Granulocytes %, Automated 0.8 0.0 - 0.9 %     Lymphocytes % 12.4 13.0 - 44.0 %     Monocytes % 10.9 2.0 - 10.0 %     Eosinophils % 2.0 0.0 - 6.0 %     Basophils % 0.2 0.0 - 2.0 %     Neutrophils Absolute 4.47 1.60 - 5.50 x10*3/uL     Immature Granulocytes Absolute, Automated 0.05 0.00 - 0.50 x10*3/uL     Lymphocytes Absolute 0.75 (L) 0.80 - 3.00 x10*3/uL     Monocytes Absolute 0.66 0.05 - 0.80 x10*3/uL     Eosinophils Absolute 0.12 0.00 - 0.40 x10*3/uL     Basophils Absolute 0.01 0.00 - 0.10 x10*3/uL   Magnesium   Result Value Ref Range     Magnesium 1.83 1.60 - 2.40 mg/dL   Comprehensive metabolic panel   Result Value Ref Range     Glucose 114 (H) 74 - 99 mg/dL     Sodium 143 136 - 145 mmol/L     Potassium 5.4 (H) 3.5 - 5.3 mmol/L     Chloride 102 98 - 107 mmol/L     Bicarbonate 40 (HH) 21 - 32  mmol/L     Anion Gap <7 (L) 10 - 20 mmol/L     Urea Nitrogen 26 (H) 6 - 23 mg/dL     Creatinine 1.14 (H) 0.50 - 1.05 mg/dL     eGFR 51 (L) >60 mL/min/1.73m*2     Calcium 8.7 8.6 - 10.3 mg/dL     Albumin 3.2 (L) 3.4 - 5.0 g/dL     Alkaline Phosphatase 100 33 - 136 U/L     Total Protein 5.8 (L) 6.4 - 8.2 g/dL     AST 15 9 - 39 U/L     Bilirubin, Total 0.2 0.0 - 1.2 mg/dL     ALT 16 7 - 45 U/L   Type And Screen   Result Value Ref Range     ABO TYPE A       Rh TYPE POS       ANTIBODY SCREEN NEG     Protime-INR   Result Value Ref Range     Protime 27.6 (H) 9.8 - 12.8 seconds     INR 2.4 (H) 0.9 - 1.1   aPTT   Result Value Ref Range     aPTT 41 (H) 27 - 38 seconds         XR shoulder left 2+ views     Result Date: 10/21/2024  Interpreted By:  Carola Oliva, STUDY: XR SHOULDER LEFT 2+ VIEWS; ;  10/20/2024 11:52 pm   INDICATION: Signs/Symptoms:fall.   COMPARISON: None.   ACCESSION NUMBER(S): [id number]   ORDERING CLINICIAN: YAKELIN MOROCHO   FINDINGS: Two views left shoulder. No acute fracture or malalignment. Left glenohumeral osteoarthrosis noted. Several chronic left rib fractures are noted. The left lung is clear        No acute fracture or malalignment.   Glenohumeral osteoarthrosis.     MACRO: None   Signed by: Carola Oliva 10/21/2024 12:43 AM Dictation workstation:   [id number]     XR pelvis 1-2 views     Result Date: 10/21/2024  Interpreted By:  Carola Oliva, STUDY: XR PELVIS 1-2 VIEWS; XR FEMUR LEFT 2+ VIEWS; ;  10/20/2024 11:52 pm   INDICATION: Signs/Symptoms:fall.   COMPARISON: None.   ACCESSION NUMBER(S): [id number]; [id number]   ORDERING CLINICIAN: YAKELIN MOROCHO   FINDINGS: Single view pelvis and two views left femur. There is an acute, mildly impacted transcervical femoral neck fracture. Mild bilateral hip osteoarthrosis. Chronic left pubic rami and body fractures.        Acute, mildly impacted left transcervical femoral neck fracture.     MACRO: None   Signed by: Carola Oliva 10/21/2024 12:42 AM  Dictation workstation:   [id number]     XR femur left 2+ views     Result Date: 10/21/2024  Interpreted By:  Carola Oliva, STUDY: XR PELVIS 1-2 VIEWS; XR FEMUR LEFT 2+ VIEWS; ;  10/20/2024 11:52 pm   INDICATION: Signs/Symptoms:fall.   COMPARISON: None.   ACCESSION NUMBER(S): [id number]; [id number]   ORDERING CLINICIAN: YAKELIN MOROCHO   FINDINGS: Single view pelvis and two views left femur. There is an acute, mildly impacted transcervical femoral neck fracture. Mild bilateral hip osteoarthrosis. Chronic left pubic rami and body fractures.        Acute, mildly impacted left transcervical femoral neck fracture.     MACRO: None   Signed by: Carola Oliva 10/21/2024 12:42 AM Dictation workstation:   [id number]     XR chest 1 view     Result Date: 10/21/2024  Interpreted By:  Carola Oliva, STUDY: XR CHEST 1 VIEW;  10/20/2024 11:52 pm   INDICATION: Signs/Symptoms:fall.   COMPARISON: 04/14/2022   ACCESSION NUMBER(S): [id number]   ORDERING CLINICIAN: YAKELIN MOROCHO   FINDINGS:     CARDIOMEDIASTINAL SILHOUETTE: Stable cardiomegaly.   LUNGS: Chronic interstitial coarsening suggesting COPD/emphysema. There is blunting of the right costophrenic angle. No pulmonary consolidation or pneumothorax.   ABDOMEN: No remarkable upper abdominal findings.   BONES: No acute osseous abnormality.        Small right pleural effusion or pleural thickening.   Chronic interstitial coarsening suggesting COPD/emphysema.   MACRO: None   Signed by: Carola Oliva 10/21/2024 12:40 AM Dictation workstation:   [id number]               Assessment/Plan     Assessment & Plan    Acute/Chronic hypoxic/hypercapnic respiratory failure   COPD exacerbation  Atelectasis/small pleural effusions on chest x-ray   Left hip fracture s/p ORIF 10/22    Hyperkalemia     Hyperglycemia     Hypercoagulable state (Multi)     Factor V Leiden, prothrombin gene mutation (Multi)        CKD (chronic kidney disease) stage 3, GFR 30-59 ml/min (Multi)        -DuoNeb for  bronchodilation.  -Continue nasal cannula oxygen, titrate as needed.  -Will give 5 mg of vitamin K for INR reversal.  -Patient need to be back on anticoagulation once surgery is completed.  -Hold losartan and oral potassium due to hypokalemia.  -Telemetry monitoring.  -Incentive spirometer.  -Orthopedics consult.  -Optimize pain control.  -Bowel regimen.  -SCD for DVT prophylaxis.    Pulmonary comments:-Chart reviewed, atient examined, obtained additional history from patient.  Reviewed various x-rays labs EKG etc.  Patient COPD exacerbation appears to be getting better.  However would continue IV Solu-Medrol and generic DuoNebs around-the-clock as above for now.  Patient respiratory status has improved and hence would cut the BiPAP to 8/4 to be used nightly in a.m. as needed.  A/T/S pamphlets on COPD, sleep apnea and nicotine dependence and given.  Thank you for this referral.  I shall continue to monitor this patient with you.    I spent 55 minutes in the professional and overall care of this patient.      Humberto Mcclendon MD

## 2024-10-25 NOTE — NURSING NOTE
RRN follow-up:   Down graded from ICU 10/24. VSS. NSR on tele. 96% on 4L NC. Spoke to Sb DIAMOND regarding new BiPAP order while patient in med surg. Surgical dressing is clean, dry and intact. Bilateral pedal pulses palpable. 1-2+ edema in BL lower extremities. No concerns from nursing staff at this time.

## 2024-10-25 NOTE — PROGRESS NOTES
Hip surgery    Progress Note    Subjective:     Post-Operative Day: 3  Status Post left Hip hemiarthroplasty    Systemic or Specific Complaints: No Complaints    Objective:     Visit Vitals  /63 (BP Location: Left arm, Patient Position: Lying)   Pulse 80   Temp 36.7 °C (98.1 °F) (Temporal)   Resp 18       General: alert and oriented, in no acute distress, appears stated age, and cooperative   Wound: no erythema, no edema, no drainage, and dressing CDI   Motion: Painful range of Motion   DVT Exam: Posterior calf tenderness present. Left lower extremity        NVI in lower extremity. Thigh swollen but soft. Moving foot and ankle.      Data Review  Recent Results (from the past 24 hours)   Creatinine Clearance    Collection Time: 10/24/24  8:32 AM   Result Value Ref Range    Collection period 24 hrs    Urine Volume 850 mL    Creatinine, Urine 51.7 20.0 - 320.0 mg/dL    Creatinine, 24 Hour Urine 0.44 (L) 0.67 - 1.59 g/24 h    Creatinine Clearance 23 mL/min   Basic Metabolic Panel    Collection Time: 10/25/24  5:28 AM   Result Value Ref Range    Glucose 109 (H) 74 - 99 mg/dL    Sodium 138 136 - 145 mmol/L    Potassium 5.5 (H) 3.5 - 5.3 mmol/L    Chloride 94 (L) 98 - 107 mmol/L    Bicarbonate 40 (HH) 21 - 32 mmol/L    Anion Gap 10 10 - 20 mmol/L    Urea Nitrogen 52 (H) 6 - 23 mg/dL    Creatinine 1.06 (H) 0.50 - 1.05 mg/dL    eGFR 55 (L) >60 mL/min/1.73m*2    Calcium 8.5 (L) 8.6 - 10.3 mg/dL   CBC and Auto Differential    Collection Time: 10/25/24  5:28 AM   Result Value Ref Range    WBC 7.0 4.4 - 11.3 x10*3/uL    nRBC 0.0 0.0 - 0.0 /100 WBCs    RBC 3.16 (L) 4.00 - 5.20 x10*6/uL    Hemoglobin 9.6 (L) 12.0 - 16.0 g/dL    Hematocrit 31.5 (L) 36.0 - 46.0 %     80 - 100 fL    MCH 30.4 26.0 - 34.0 pg    MCHC 30.5 (L) 32.0 - 36.0 g/dL    RDW 13.5 11.5 - 14.5 %    Platelets 228 150 - 450 x10*3/uL    Neutrophils % 85.4 40.0 - 80.0 %    Immature Granulocytes %, Automated 0.7 0.0 - 0.9 %    Lymphocytes % 5.5 13.0 -  44.0 %    Monocytes % 8.2 2.0 - 10.0 %    Eosinophils % 0.1 0.0 - 6.0 %    Basophils % 0.1 0.0 - 2.0 %    Neutrophils Absolute 5.93 (H) 1.60 - 5.50 x10*3/uL    Immature Granulocytes Absolute, Automated 0.05 0.00 - 0.50 x10*3/uL    Lymphocytes Absolute 0.38 (L) 0.80 - 3.00 x10*3/uL    Monocytes Absolute 0.57 0.05 - 0.80 x10*3/uL    Eosinophils Absolute 0.01 0.00 - 0.40 x10*3/uL    Basophils Absolute 0.01 0.00 - 0.10 x10*3/uL   Magnesium    Collection Time: 10/25/24  5:28 AM   Result Value Ref Range    Magnesium 2.19 1.60 - 2.40 mg/dL   Protime-INR    Collection Time: 10/25/24  5:28 AM   Result Value Ref Range    Protime 18.6 (H) 9.8 - 12.8 seconds    INR 1.6 (H) 0.9 - 1.1   Heparin Assay    Collection Time: 10/25/24  7:11 AM   Result Value Ref Range    Heparin Unfractionated 0.2 See Comment Below for Therapeutic Ranges IU/mL     XR hip left with pelvis when performed 2 or 3 views    Result Date: 10/23/2024  Interpreted By:  Best Garcia, STUDY: XR HIP LEFT WITH PELVIS WHEN PERFORMED 2 OR 3 VIEWS;  10/22/2024 4:14 pm   INDICATION: Signs/Symptoms:post op L hip bia in RR.     COMPARISON: 10/20/2024.   ACCESSION NUMBER(S): [id number]   ORDERING CLINICIAN: NEREIDA EGAN   TECHNIQUE: Single frontal view of the left hip was obtained.   FINDINGS: Postoperative changes of recent left total hip arthroplasty are seen. Hardware components appear intact with good alignment on frontal projection. Regional soft tissue swelling and gas is present as well as a lateral skin staple line. Marginal spurring and sclerosis of the pubic symphysis is partially visualized, similar to prior.       Postoperative changes of recent left total hip arthroplasty. Intact appearing hardware components with good alignment on frontal projection.   MACRO: None.   Signed by: Best Garcia 10/23/2024 8:26 AM Dictation workstation:   [id number]    CT hip left wo IV contrast    Result Date: 10/22/2024  Interpreted By:  Heladio Beckett, STUDY: CT  HIP LEFT WO IV CONTRAST; CT 3D RECONSTRUCTION; ;  10/21/2024 10:48 am; 10/21/2024 10:51 am   INDICATION: Signs/Symptoms:left femoral neck fracture.     COMPARISON: Plain film radiographs October 20   ACCESSION NUMBER(S): [id number]; [id number]   ORDERING CLINICIAN: JONATHAN GAMEZ   TECHNIQUE: Multiple thin-section axial images left hip reconstructed in the sagittal and coronal plane without contrast.   FINDINGS: Again note is made of a nondisplaced transcervical left femoral neck fracture similar to recent plain film. Slight posterior angulation noted.   No evidence of dislocation.   Osteoarthritis of the left hip with labral calcifications.   No other acute fractures are seen.   No bony lesions.   No focal fluid collections.   Gluteus medius atrophic consistent with chronic tear.   No other findings suggestive of acute tendinous tear.   Hamstring origin calcifications.   Deformity of the left parasymphyseal pubis consistent with remote healed fractures.       Nondisplaced transcervical left femoral neck fracture.   Remote left parasymphyseal pubic fractures.   Chronic tear left gluteus medius.     MACRO: None   Signed by: Heladio Beckett 10/22/2024 7:34 AM Dictation workstation:   [id number]    CT 3D reconstruction    Result Date: 10/22/2024  Interpreted By:  Heladio Beckett, STUDY: CT HIP LEFT WO IV CONTRAST; CT 3D RECONSTRUCTION; ;  10/21/2024 10:48 am; 10/21/2024 10:51 am   INDICATION: Signs/Symptoms:left femoral neck fracture.     COMPARISON: Plain film radiographs October 20   ACCESSION NUMBER(S): [id number]; [id number]   ORDERING CLINICIAN: JONATHAN GAMEZ   TECHNIQUE: Multiple thin-section axial images left hip reconstructed in the sagittal and coronal plane without contrast.   FINDINGS: Again note is made of a nondisplaced transcervical left femoral neck fracture similar to recent plain film. Slight posterior angulation noted.   No evidence of dislocation.   Osteoarthritis of the left hip with labral  calcifications.   No other acute fractures are seen.   No bony lesions.   No focal fluid collections.   Gluteus medius atrophic consistent with chronic tear.   No other findings suggestive of acute tendinous tear.   Hamstring origin calcifications.   Deformity of the left parasymphyseal pubis consistent with remote healed fractures.       Nondisplaced transcervical left femoral neck fracture.   Remote left parasymphyseal pubic fractures.   Chronic tear left gluteus medius.     MACRO: None   Signed by: Heladio Beckett 10/22/2024 7:34 AM Dictation workstation:   [id number]    XR chest 1 view    Result Date: 10/21/2024  Interpreted By:  Abby Esquivel, STUDY: XR CHEST 1 VIEW;  10/21/2024 7:20 pm   INDICATION: Signs/Symptoms:acute on chronic hypercapnic/hypoxic respiratory failure.   COMPARISON: Chest x-ray 10/20/2024.   ACCESSION NUMBER(S): [id number]   ORDERING CLINICIAN: RICHARD ROBERTS   FINDINGS: Multiple overlying leads are present.   CARDIOMEDIASTINAL SILHOUETTE: Cardiomediastinal silhouette is stable in size and configuration. Calcified mediastinal hilar lymph nodes noted. Atherosclerotic calcification of the aorta.   LUNGS: Bibasilar opacities with blunting of the costophrenic angles likely relate to layering effusions and atelectasis. Superimposed infection not excluded. No pneumothorax. Biapical pleuroparenchymal scarring. Stable mild interstitial prominence.   ABDOMEN: No remarkable upper abdominal findings.   BONES: Multilevel degenerative changes of the spine.       Cardiomegaly with mild interstitial prominence suggestive of developing interstitial edema/CHF.   Bibasilar opacities likely relate to layering effusions and atelectasis. Superimposed infection not excluded. Attention on continued follow-up is advised.   Findings suggestive of prior granulomatous infection.   MACRO: None   Signed by: Abby Esquivel 10/21/2024 7:51 PM Dictation workstation:   [id number]    ECG 12 Lead    Result Date:  10/21/2024  Atrial fibrillation with premature ventricular or aberrantly conducted complexes Rightward axis ST & T wave abnormality, consider lateral ischemia Abnormal ECG When compared with ECG of 14-APR-2022 19:29, Atrial fibrillation has replaced Sinus rhythm Questionable change in QRS axis T wave inversion now evident in Anterior leads    XR shoulder left 2+ views    Result Date: 10/21/2024  Interpreted By:  Carola Oliva, STUDY: XR SHOULDER LEFT 2+ VIEWS; ;  10/20/2024 11:52 pm   INDICATION: Signs/Symptoms:fall.   COMPARISON: None.   ACCESSION NUMBER(S): [id number]   ORDERING CLINICIAN: YAKELIN MOROCHO   FINDINGS: Two views left shoulder. No acute fracture or malalignment. Left glenohumeral osteoarthrosis noted. Several chronic left rib fractures are noted. The left lung is clear       No acute fracture or malalignment.   Glenohumeral osteoarthrosis.     MACRO: None   Signed by: Carola Oliva 10/21/2024 12:43 AM Dictation workstation:   [id number]    XR pelvis 1-2 views    Result Date: 10/21/2024  Interpreted By:  Carola Oliva, STUDY: XR PELVIS 1-2 VIEWS; XR FEMUR LEFT 2+ VIEWS; ;  10/20/2024 11:52 pm   INDICATION: Signs/Symptoms:fall.   COMPARISON: None.   ACCESSION NUMBER(S): [id number]; [id number]   ORDERING CLINICIAN: YAKELIN MOROCHO   FINDINGS: Single view pelvis and two views left femur. There is an acute, mildly impacted transcervical femoral neck fracture. Mild bilateral hip osteoarthrosis. Chronic left pubic rami and body fractures.       Acute, mildly impacted left transcervical femoral neck fracture.     MACRO: None   Signed by: Carola Oliva 10/21/2024 12:42 AM Dictation workstation:   [id number]    XR femur left 2+ views    Result Date: 10/21/2024  Interpreted By:  Carola Oliva, STUDY: XR PELVIS 1-2 VIEWS; XR FEMUR LEFT 2+ VIEWS; ;  10/20/2024 11:52 pm   INDICATION: Signs/Symptoms:fall.   COMPARISON: None.   ACCESSION NUMBER(S): [id number]; [id number]   ORDERING CLINICIAN: YAKELIN MOROCHO    FINDINGS: Single view pelvis and two views left femur. There is an acute, mildly impacted transcervical femoral neck fracture. Mild bilateral hip osteoarthrosis. Chronic left pubic rami and body fractures.       Acute, mildly impacted left transcervical femoral neck fracture.     MACRO: None   Signed by: Carola Oliva 10/21/2024 12:42 AM Dictation workstation:   [id number]    XR chest 1 view    Result Date: 10/21/2024  Interpreted By:  Carola Oliva, STUDY: XR CHEST 1 VIEW;  10/20/2024 11:52 pm   INDICATION: Signs/Symptoms:fall.   COMPARISON: 04/14/2022   ACCESSION NUMBER(S): [id number]   ORDERING CLINICIAN: YAKELIN MOROCHO   FINDINGS:     CARDIOMEDIASTINAL SILHOUETTE: Stable cardiomegaly.   LUNGS: Chronic interstitial coarsening suggesting COPD/emphysema. There is blunting of the right costophrenic angle. No pulmonary consolidation or pneumothorax.   ABDOMEN: No remarkable upper abdominal findings.   BONES: No acute osseous abnormality.       Small right pleural effusion or pleural thickening.   Chronic interstitial coarsening suggesting COPD/emphysema.   MACRO: None   Signed by: Carola Oliva 10/21/2024 12:40 AM Dictation workstation:   [id number]      Assessment:     Status Post left Hip hemiarthroplasty. Doing well postoperatively. No acute events overnight.     10/24: Patient reported complaints of left posterior calf pain. venous duplex showed Positive for nonocclusive thrombosis of the right common femoral, left common femoral, and proximal aspect of the left femoral veins.    10/25:   Acute postoperative pain: Reports mild to moderate pain to operative extremity. Exacerbated by movement, relieved by rest ice and pain medication. Continue current pain management.    INR this am 1.6. Continues on coumadin/heparin bridge.       Plan:      1: Continues current post-op course   2:  Continue Deep venous thrombosis prophylaxis: coumadin heparin bridge. INR this am 1.6 Continues to be subtherapeutic. Continue  to monitor daily heparin and INR.   3:  Continue physical therapy: Weight-bearing as tolerated with posterior hip precautions with use of walker for assistance with ambulation.   4:  Continue Pain Control, script in chart.   5.  Discharge planning: Patient will go to [facility] upon discharge. SW and TCC following. Okay to discharge from orthopedic standpoint when medically cleared. Follow up in 2 weeks.       Lisa Augustin, CONCHIS-CNP

## 2024-10-25 NOTE — CARE PLAN
The patient's goals for the shift include  will not fall throughout the shift    The clinical goals for the shift include Patient will remain safe and HD stable, No bleeding, no DVT complications during this shift.

## 2024-10-25 NOTE — PROGRESS NOTES
10/25/24 1153   Discharge Planning   Home or Post Acute Services Post acute facilities (Rehab/SNF/etc)   Type of Post Acute Facility Services Rehab;Skilled nursing   Expected Discharge Disposition SNF   Does the patient need discharge transport arranged? Yes   RoundTrip coordination needed? Yes   Has discharge transport been arranged? No   What day is the transport expected? 10/25/24     Pt is s/p POD #3 Left partial hip arthroplasty for non-displaced left femur fracture on 10/22/24 with Dr. Eligio Whitten. Pt is weight bearing as tolerated with posterior hip precautions, pt will DC on Coumadin-Lovenox bridge. Pt DC preference remains SNF- [facility]. Team initiated SNF precert on 10/24 which is currently pending. CT team continuing to monitor case for progression and DC planning.

## 2024-10-25 NOTE — CARE PLAN
The patient's goals for the shift include      The clinical goals for the shift include Patient will remain safe and HD stable, No bleeding, no DVT complications during this shift.    Problem: Pain - Adult  Goal: Verbalizes/displays adequate comfort level or baseline comfort level  10/25/2024 0651 by Miles Villalpando RN  Outcome: Progressing  10/25/2024 0101 by Miles Villalpando RN  Flowsheets (Taken 10/25/2024 0101)  Verbalizes/displays adequate comfort level or baseline comfort level:   Encourage patient to monitor pain and request assistance   Assess pain using appropriate pain scale   Administer analgesics based on type and severity of pain and evaluate response   Implement non-pharmacological measures as appropriate and evaluate response   Consider cultural and social influences on pain and pain management   Notify Licensed Independent Practitioner if interventions unsuccessful or patient reports new pain     Problem: Safety - Adult  Goal: Free from fall injury  10/25/2024 0651 by Miles Villalpando RN  Outcome: Progressing  10/25/2024 0101 by Miles Villalpando RN  Flowsheets (Taken 10/25/2024 0101)  Free from fall injury: Instruct family/caregiver on patient safety     Problem: Discharge Planning  Goal: Discharge to home or other facility with appropriate resources  10/25/2024 0651 by Miles Villalpando RN  Outcome: Progressing  10/25/2024 0101 by Miles Villalpando RN  Flowsheets (Taken 10/25/2024 0101)  Discharge to home or other facility with appropriate resources:   Identify barriers to discharge with patient and caregiver   Arrange for needed discharge resources and transportation as appropriate   Identify discharge learning needs (meds, wound care, etc)   Arrange for interpreters to assist at discharge as needed   Refer to discharge planning if patient needs post-hospital services based on physician order or complex needs related to functional status, cognitive ability or social support system     Problem: Chronic  Conditions and Co-morbidities  Goal: Patient's chronic conditions and co-morbidity symptoms are monitored and maintained or improved  10/25/2024 0651 by Miles Villalpando RN  Outcome: Progressing  10/25/2024 0101 by Miles Villalpando RN  Flowsheets (Taken 10/25/2024 0101)  Care Plan - Patient's Chronic Conditions and Co-Morbidity Symptoms are Monitored and Maintained or Improved:   Monitor and assess patient's chronic conditions and comorbid symptoms for stability, deterioration, or improvement   Collaborate with multidisciplinary team to address chronic and comorbid conditions and prevent exacerbation or deterioration   Update acute care plan with appropriate goals if chronic or comorbid symptoms are exacerbated and prevent overall improvement and discharge     Problem: Skin  Goal: Decreased wound size/increased tissue granulation at next dressing change  10/25/2024 0651 by Miles Villalpando RN  Outcome: Progressing  10/25/2024 0101 by Miles Villalpando RN  Flowsheets (Taken 10/24/2024 1405 by Erlinda Whitley RN)  Decreased wound size/increased tissue granulation at next dressing change:   Promote sleep for wound healing   Protective dressings over bony prominences   Utilize specialty bed per algorithm  Goal: Participates in plan/prevention/treatment measures  10/25/2024 0651 by Miles Villalpando RN  Outcome: Progressing  10/25/2024 0101 by Miles Villalpando RN  Flowsheets (Taken 10/24/2024 1405 by Erlinda Whitley RN)  Participates in plan/prevention/treatment measures:   Discuss with provider PT/OT consult   Increase activity/out of bed for meals   Elevate heels  Goal: Prevent/manage excess moisture  10/25/2024 0651 by Miles Villalpando RN  Outcome: Progressing  10/25/2024 0101 by Miles Villalpando RN  Flowsheets (Taken 10/24/2024 1405 by Erlinda Whitley RN)  Prevent/manage excess moisture:   Cleanse incontinence/protect with barrier cream   Monitor for/manage infection if present   Follow provider orders for dressing changes    Moisturize dry skin  Goal: Prevent/minimize sheer/friction injuries  10/25/2024 0651 by Miles Villalpando RN  Outcome: Progressing  10/25/2024 0101 by Miles Villalpando RN  Flowsheets (Taken 10/24/2024 1405 by Erlinda Whitley RN)  Prevent/minimize sheer/friction injuries:   Increase activity/out of bed for meals   Use pull sheet   Turn/reposition every 2 hours/use positioning/transfer devices   Utilize specialty bed per algorithm  Goal: Promote/optimize nutrition  10/25/2024 0651 by Miles Villalpando RN  Outcome: Progressing  10/25/2024 0101 by Miles Villalpando RN  Flowsheets (Taken 10/24/2024 1405 by Erlinda Whitley RN)  Promote/optimize nutrition:   Monitor/record intake including meals   Consume > 50% meals/supplements   Offer water/supplements/favorite foods  Goal: Promote skin healing  10/25/2024 0651 by Miles Villalpando RN  Outcome: Progressing  10/25/2024 0101 by Miles Villalpando RN  Flowsheets (Taken 10/24/2024 1405 by Erlinda Whitley RN)  Promote skin healing:   Assess skin/pad under line(s)/device(s)   Protective dressings over bony prominences   Turn/reposition every 2 hours/use positioning/transfer devices   Rotate device position/do not position patient on device     Problem: Pain  Goal: Takes deep breaths with improved pain control throughout the shift  Outcome: Progressing  Goal: Turns in bed with improved pain control throughout the shift  Outcome: Progressing  Goal: Walks with improved pain control throughout the shift  Outcome: Progressing  Goal: Performs ADL's with improved pain control throughout shift  Outcome: Progressing  Goal: Participates in PT with improved pain control throughout the shift  Outcome: Progressing  Goal: Free from opioid side effects throughout the shift  Outcome: Progressing  Goal: Free from acute confusion related to pain meds throughout the shift  Outcome: Progressing

## 2024-10-25 NOTE — NURSING NOTE
I spoke on phone with patient's daughter who is also the patient's POA. They expressed concerns about the lack of communication from the medical team concerning the care of her mother. For instance, she said that no one had informed her nor updated her when the patient was transferred to ICU and when the patient left the ICU to this unit, despite the fact that she had requested to be updated on the patient's progress. This RN assured them that the message will be passed on to the medical team.

## 2024-10-26 LAB
ANION GAP SERPL CALC-SCNC: 8 MMOL/L (ref 10–20)
BASOPHILS # BLD AUTO: 0.01 X10*3/UL (ref 0–0.1)
BASOPHILS NFR BLD AUTO: 0.2 %
BUN SERPL-MCNC: 54 MG/DL (ref 6–23)
CALCIUM SERPL-MCNC: 8.6 MG/DL (ref 8.6–10.3)
CHLORIDE SERPL-SCNC: 94 MMOL/L (ref 98–107)
CO2 SERPL-SCNC: 45 MMOL/L (ref 21–32)
CREAT SERPL-MCNC: 0.98 MG/DL (ref 0.5–1.05)
EGFRCR SERPLBLD CKD-EPI 2021: 61 ML/MIN/1.73M*2
EOSINOPHIL # BLD AUTO: 0.02 X10*3/UL (ref 0–0.4)
EOSINOPHIL NFR BLD AUTO: 0.3 %
ERYTHROCYTE [DISTWIDTH] IN BLOOD BY AUTOMATED COUNT: 13.6 % (ref 11.5–14.5)
GLUCOSE SERPL-MCNC: 105 MG/DL (ref 74–99)
HCT VFR BLD AUTO: 30.8 % (ref 36–46)
HGB BLD-MCNC: 9.5 G/DL (ref 12–16)
HOLD SPECIMEN: NORMAL
IMM GRANULOCYTES # BLD AUTO: 0.06 X10*3/UL (ref 0–0.5)
IMM GRANULOCYTES NFR BLD AUTO: 1 % (ref 0–0.9)
INR PPP: 1.8 (ref 0.9–1.1)
LYMPHOCYTES # BLD AUTO: 0.98 X10*3/UL (ref 0.8–3)
LYMPHOCYTES NFR BLD AUTO: 16.1 %
MAGNESIUM SERPL-MCNC: 2.13 MG/DL (ref 1.6–2.4)
MCH RBC QN AUTO: 31 PG (ref 26–34)
MCHC RBC AUTO-ENTMCNC: 30.8 G/DL (ref 32–36)
MCV RBC AUTO: 101 FL (ref 80–100)
MONOCYTES # BLD AUTO: 0.71 X10*3/UL (ref 0.05–0.8)
MONOCYTES NFR BLD AUTO: 11.7 %
NEUTROPHILS # BLD AUTO: 4.31 X10*3/UL (ref 1.6–5.5)
NEUTROPHILS NFR BLD AUTO: 70.7 %
NRBC BLD-RTO: 0 /100 WBCS (ref 0–0)
PLATELET # BLD AUTO: 263 X10*3/UL (ref 150–450)
POTASSIUM SERPL-SCNC: 5 MMOL/L (ref 3.5–5.3)
PROTHROMBIN TIME: 20.9 SECONDS (ref 9.8–12.8)
RBC # BLD AUTO: 3.06 X10*6/UL (ref 4–5.2)
SODIUM SERPL-SCNC: 142 MMOL/L (ref 136–145)
UFH PPP CHRO-ACNC: 0.5 IU/ML
WBC # BLD AUTO: 6.1 X10*3/UL (ref 4.4–11.3)

## 2024-10-26 PROCEDURE — 99233 SBSQ HOSP IP/OBS HIGH 50: CPT | Performed by: INTERNAL MEDICINE

## 2024-10-26 PROCEDURE — 94669 MECHANICAL CHEST WALL OSCILL: CPT

## 2024-10-26 PROCEDURE — 94667 MNPJ CHEST WALL 1ST: CPT

## 2024-10-26 PROCEDURE — 2500000002 HC RX 250 W HCPCS SELF ADMINISTERED DRUGS (ALT 637 FOR MEDICARE OP, ALT 636 FOR OP/ED)

## 2024-10-26 PROCEDURE — 36415 COLL VENOUS BLD VENIPUNCTURE: CPT | Performed by: FAMILY MEDICINE

## 2024-10-26 PROCEDURE — 94640 AIRWAY INHALATION TREATMENT: CPT

## 2024-10-26 PROCEDURE — 2500000004 HC RX 250 GENERAL PHARMACY W/ HCPCS (ALT 636 FOR OP/ED)

## 2024-10-26 PROCEDURE — 2500000001 HC RX 250 WO HCPCS SELF ADMINISTERED DRUGS (ALT 637 FOR MEDICARE OP)

## 2024-10-26 PROCEDURE — 1100000001 HC PRIVATE ROOM DAILY

## 2024-10-26 PROCEDURE — 85520 HEPARIN ASSAY: CPT | Performed by: FAMILY MEDICINE

## 2024-10-26 PROCEDURE — 94660 CPAP INITIATION&MGMT: CPT

## 2024-10-26 PROCEDURE — 83735 ASSAY OF MAGNESIUM: CPT

## 2024-10-26 PROCEDURE — 2500000005 HC RX 250 GENERAL PHARMACY W/O HCPCS

## 2024-10-26 PROCEDURE — 80048 BASIC METABOLIC PNL TOTAL CA: CPT

## 2024-10-26 PROCEDURE — 85025 COMPLETE CBC W/AUTO DIFF WBC: CPT

## 2024-10-26 PROCEDURE — 85610 PROTHROMBIN TIME: CPT

## 2024-10-26 RX ORDER — IPRATROPIUM BROMIDE AND ALBUTEROL SULFATE 2.5; .5 MG/3ML; MG/3ML
3 SOLUTION RESPIRATORY (INHALATION)
Status: DISCONTINUED | OUTPATIENT
Start: 2024-10-27 | End: 2024-10-27 | Stop reason: HOSPADM

## 2024-10-26 ASSESSMENT — COGNITIVE AND FUNCTIONAL STATUS - GENERAL
DAILY ACTIVITIY SCORE: 16
TOILETING: A LOT
MOVING TO AND FROM BED TO CHAIR: A LOT
MOBILITY SCORE: 11
DRESSING REGULAR UPPER BODY CLOTHING: A LITTLE
DRESSING REGULAR LOWER BODY CLOTHING: A LOT
CLIMB 3 TO 5 STEPS WITH RAILING: TOTAL
STANDING UP FROM CHAIR USING ARMS: A LOT
HELP NEEDED FOR BATHING: A LITTLE
EATING MEALS: A LITTLE
WALKING IN HOSPITAL ROOM: A LOT
TURNING FROM BACK TO SIDE WHILE IN FLAT BAD: A LOT
PERSONAL GROOMING: A LITTLE
MOVING FROM LYING ON BACK TO SITTING ON SIDE OF FLAT BED WITH BEDRAILS: A LOT

## 2024-10-26 ASSESSMENT — PAIN SCALES - GENERAL
PAINLEVEL_OUTOF10: 6
PAINLEVEL_OUTOF10: 0 - NO PAIN
PAINLEVEL_OUTOF10: 0 - NO PAIN

## 2024-10-26 ASSESSMENT — PAIN - FUNCTIONAL ASSESSMENT: PAIN_FUNCTIONAL_ASSESSMENT: 0-10

## 2024-10-26 NOTE — CARE PLAN
Problem: Skin  Goal: Decreased wound size/increased tissue granulation at next dressing change  Outcome: Progressing  Flowsheets (Taken 10/26/2024 0528)  Decreased wound size/increased tissue granulation at next dressing change: Promote sleep for wound healing   The patient's goals for the shift include      The clinical goals for the shift include patient will remain sate throughout the shift

## 2024-10-26 NOTE — CARE PLAN
The patient's goals for the shift include      The clinical goals for the shift include pt will remain hemodymanically stable throughout shift    Over the shift, the patient did not make progress toward the following goals. Barriers to progression include movement and pain management. Recommendations to address these barriers include medications and monitoring.

## 2024-10-26 NOTE — NURSING NOTE
RRN follow-up:  Down graded from ICU 10/24. VSS. 92% on 4LNC. BiPap at night. Surgical dressing CDI. No concerns at this time.

## 2024-10-26 NOTE — PROGRESS NOTES
ADMISSION DATE: 10/20/2024  HOSPITAL DAY: 5    SUBJECTIVE:  Patient was seen at bedside.  She was sitting on the couch.  She is requiring supplemental oxygen of 4 L via nasal cannula.  Heparin drip is running.  Her INR is 1.8 today.    OBJECTIVE:  Vitals:    10/26/24 0248 10/26/24 0252 10/26/24 0729 10/26/24 1437   BP: 94/61 152/78 170/75 131/63   BP Location:  Right arm     Pulse: 69  69 81   Resp:       Temp: 35.6 °C (96.1 °F)  36.3 °C (97.3 °F) 36.5 °C (97.7 °F)   TempSrc:       SpO2: 92%  93% 96%   Weight:       Height:            Intake/Output Summary (Last 24 hours) at 10/26/2024 1602  Last data filed at 10/26/2024 0903  Gross per 24 hour   Intake --   Output 400 ml   Net -400 ml      Wt Readings from Last 10 Encounters:   10/22/24 72.2 kg (159 lb 2.8 oz)       PHYSICAL EXAM:  Gen: Alert, awake, Oriented X 3. Not in any acute distress   HEENT:  Atraumatic, PERRL.  Conjunctivae clear.   Moist nasal mucous membranes. oropharynx non erythematous,   Neck:  Supple without thyromegaly or lymphadenopathy.  Lungs:  Clear to auscultation without rales, rhonchi, or rub.  Heart:  RRR, S1, S2, without M.  Abdomen:  Soft, non tender, no organ enlargement, bruit. Bowel sounds present . No CVA tenderness.  Extremities:  No edema. No calf swelling or tenderness.    Skin:  No rash, ecchymosis or erythema.    CURRENT ACTIVE MEDS:  carvedilol, 6.25 mg, oral, BID  DULoxetine, 60 mg, oral, Daily  ipratropium-albuteroL, 3 mL, nebulization, q4h  [Held by provider] montelukast, 10 mg, oral, Nightly  oxygen, , inhalation, Continuous - Inhalation  polyethylene glycol, 17 g, oral, Daily  torsemide, 20 mg, oral, Daily  warfarin, 5 mg, oral, Once per day on Sunday Tuesday Thursday Friday Saturday  warfarin, 7.5 mg, oral, Once per day on Monday Wednesday      LAB RESULTS:   CBC:   Results from last 7 days   Lab Units 10/26/24  0242 10/25/24  0528 10/24/24  0418   WBC AUTO x10*3/uL 6.1 7.0 7.8   RBC AUTO x10*6/uL 3.06* 3.16* 3.29*    HEMOGLOBIN g/dL 9.5* 9.6* 10.1*   HEMATOCRIT % 30.8* 31.5* 33.5*   MCV fL 101* 100 102*   MCH pg 31.0 30.4 30.7   MCHC g/dL 30.8* 30.5* 30.1*   RDW % 13.6 13.5 13.6   PLATELETS AUTO x10*3/uL 263 228 270     CMP:    Results from last 7 days   Lab Units 10/26/24  0242 10/25/24  0528 10/24/24  0418 10/21/24  0543 10/21/24  0120   SODIUM mmol/L 142 138 137   < > 143   POTASSIUM mmol/L 5.0 5.5* 4.8   < > 5.4*   CHLORIDE mmol/L 94* 94* 94*   < > 102   CO2 mmol/L 45* 40* 40*   < > 40*   BUN mg/dL 54* 52* 48*   < > 26*   CREATININE mg/dL 0.98 1.06* 1.32*   < > 1.14*   GLUCOSE mg/dL 105* 109* 119*   < > 114*   PROTEIN TOTAL g/dL  --   --   --   --  5.8*   CALCIUM mg/dL 8.6 8.5* 8.3*   < > 8.7   BILIRUBIN TOTAL mg/dL  --   --   --   --  0.2   ALK PHOS U/L  --   --   --   --  100   AST U/L  --   --   --   --  15   ALT U/L  --   --   --   --  16    < > = values in this interval not displayed.     BMP:    Results from last 7 days   Lab Units 10/26/24  0242 10/25/24  0528 10/24/24  0418   SODIUM mmol/L 142 138 137   POTASSIUM mmol/L 5.0 5.5* 4.8   CHLORIDE mmol/L 94* 94* 94*   CO2 mmol/L 45* 40* 40*   BUN mg/dL 54* 52* 48*   CREATININE mg/dL 0.98 1.06* 1.32*   CALCIUM mg/dL 8.6 8.5* 8.3*   GLUCOSE mg/dL 105* 109* 119*     Magnesium:  Results from last 7 days   Lab Units 10/26/24  0242 10/25/24  0528 10/24/24  0418   MAGNESIUM mg/dL 2.13 2.19 1.83     BNP:   Results from last 7 days   Lab Units 10/23/24  0311   BNP pg/mL 501*       Lab Results   Component Value Date    HGBA1C 6.0 (H) 05/20/2024    HGBA1C 6.2 (H) 01/11/2024    HGBA1C 6.1 (H) 10/12/2023     Lab Results   Component Value Date    CREATININE 0.98 10/26/2024       Lab Results   Component Value Date    INR 1.8 (H) 10/26/2024    INR 1.6 (H) 10/25/2024    INR 1.2 (H) 10/24/2024    PROTIME 20.9 (H) 10/26/2024    PROTIME 18.6 (H) 10/25/2024    PROTIME 13.4 (H) 10/24/2024     IMAGING STUDIES:  === 10/20/24 ===  XR HIP LEFT WITH PELVIS WHEN PERFORMED 2 OR 3  VIEWS  Postoperative changes of recent left total hip arthroplasty. Intact  appearing hardware components with good alignment on frontal  projection.    === 10/20/24 ===  CT 3D RECONSTRUCTION  Nondisplaced transcervical left femoral neck fracture.  Remote left parasymphyseal pubic fractures.  Chronic tear left gluteus medius.    LAST EKG  Encounter Date: 10/20/24   ECG 12 Lead   Result Value    Ventricular Rate 91    Atrial Rate 97    QRS Duration 62    QT Interval 360    QTC Calculation(Bazett) 442    R Axis 100    T Axis 27    QRS Count 15    Q Onset 222    T Offset 402    QTC Fredericia 414     PROBLEMS ON ADMISSION:  Fall, initial encounter [W19.XXXA]  Closed traumatic nondisplaced fracture of neck of left femur, initial encounter [S72.002A]    HOSPITAL PROBLEM LIST     CKD (chronic kidney disease) stage 3, GFR 30-59 ml/min (Multi)    COPD (chronic obstructive pulmonary disease) (Multi)    Factor V Leiden, prothrombin gene mutation (Multi)    Hyperkalemia    Hyperglycemia    Hypercoagulable state (Multi)     ASSESSMENT AND PLAN FOR 10/26/2024  Postop day 4 for left hip hemiarthroplasty.  Patient is on IV heparin bridging with Coumadin.  Her INR has to be between 2-3.  Today her INR is 1.8.  Patient also has supplemental oxygen more than her baseline.  Will try to wean her off to 2 L supplemental oxygen via nasal cannula.  Rest of the medical therapy will be continued as before.  She is working with physical therapy and OT.  She has a placement at [facility], authorization is good till 10/31/2024.  Once we can achieve the above 2 goals she will be able to be transferred out to [facility].  Will not change Coumadin dose at this time.  Will repeat INR in the morning.  Patient was given incentive spirometry/Acapella as per her choice.            P.S: This note was completed using Dragon voice recognition technology and may include unintended errors with respect to translation of words, typographical errors or  grammar errors which may not have been identified while finalizing the chart.

## 2024-10-26 NOTE — PROGRESS NOTES
Maria Guadalupe Marks is a 74 y.o. female on day 5 of admission presenting with Closed traumatic nondisplaced fracture of neck of left femur, initial encounter.    Subjective  Complains of postoperative pain  Reports improvement in shortness of breath, she is on 4 L now  Wore BiPAP for 2 hours last night.      Objective  Last Recorded Vitals  /63 (BP Location: Left arm, Patient Position: Lying)   Pulse 80   Temp 36.7 °C (98.1 °F) (Temporal)   Resp 18   Wt 72.2 kg (159 lb 2.8 oz)   SpO2 92%   Intake/Output last 3 Shifts:     Intake/Output Summary (Last 24 hours) at 10/25/2024 1313  Last data filed at 10/25/2024 0123      Gross per 24 hour   Intake 244.25 ml   Output --   Net 244.25 ml         Admission Weight  Weight: 68 kg (150 lb) (10/20/24 2234)     Daily Weight  10/22/24 : 72.2 kg (159 lb 2.8 oz)     Image Results  Lower extremity venous duplex bilateral  Narrative: Interpreted By:  Kee Cerna,   STUDY:  [facility] LOWER EXTREMITY VENOUS DUPLEX BILATERAL;  10/24/2024 9:19 am      INDICATION:  Signs/Symptoms:left lower posterior calf pain.      ,M79.662 Pain in left lower leg      COMPARISON:  None.      ACCESSION NUMBER(S):  [id number]      ORDERING CLINICIAN:  JONATHAN GAMEZ      TECHNIQUE:  Vascular ultrasound of the bilateral lower extremities was performed.  Real-time compression views as well as Gray scale, color Doppler and  spectral Doppler waveform analysis was performed.      FINDINGS:  Evaluation of the visualized portions of the bilateral common femoral  vein, proximal, mid, and distal femoral vein, and popliteal vein were  performed.  Evaluation of the visualized portions of the calf veins  was also performed.      The right common femoral, left common femoral, and the proximal  aspect of the left femoral vein are only partially compressible and  contain nonocclusive thrombus. Please note that the popliteal veins  could not be visualized on today's examination.      The remaining evaluated veins  demonstrate normal compressibility.  There is intact venous flow demonstrating normal respiratory  variability and normal augmentation of flow with calf compression.  Therefore, there is no ultrasonographic evidence for deep vein  thrombosis within the remaining evaluated veins.      Impression: Positive for nonocclusive thrombosis of the right common femoral,  left common femoral, and proximal aspect of the left femoral veins.      MACRO:  Critical Finding:  See findings. Notification was initiated on  10/24/2024 at 9:32 am by  Kee Cerna.  (**-OCF-**)      Signed by: Kee Cerna 10/24/2024 9:32 AM  Dictation workstation:   [id number]        Physical Exam  Alert and oriented x 3  Lungs scattered wheezes  Heart regular rhythm  Abdomen soft nontender  Extremities no edema  CNS no focal deficit      Assessment/Plan  Left hip fracture status post surgical repair  Acute hypoxic and hypercapnic respiratory failure, COPD exacerbation  Metabolic encephalopathy  Factor V Leiden, acute DVT  Atrial fibrillation on warfarin  Hypertension, CKD 3 with hyperkalemia     Plan:  Warfarin was restarted with IV heparin bridging  DuoNeb, IV steroids, supplemental oxygen  PT OT evaluation and treatment, will need placement    I spent 25 minutes in the professional and overall care of this patient.    Humberto Mcclendon MD

## 2024-10-27 VITALS
SYSTOLIC BLOOD PRESSURE: 131 MMHG | TEMPERATURE: 98.1 F | WEIGHT: 159.17 LBS | RESPIRATION RATE: 27 BRPM | HEART RATE: 75 BPM | DIASTOLIC BLOOD PRESSURE: 63 MMHG | BODY MASS INDEX: 31.25 KG/M2 | HEIGHT: 60 IN | OXYGEN SATURATION: 93 %

## 2024-10-27 LAB
ALBUMIN SERPL BCP-MCNC: 2.6 G/DL (ref 3.4–5)
ALP SERPL-CCNC: 76 U/L (ref 33–136)
ALT SERPL W P-5'-P-CCNC: 8 U/L (ref 7–45)
ANION GAP BLDA CALCULATED.4IONS-SCNC: -10 MMO/L (ref 10–25)
ANION GAP SERPL CALC-SCNC: ABNORMAL MMOL/L
APPARATUS: ABNORMAL
ARTERIAL PATENCY WRIST A: POSITIVE
AST SERPL W P-5'-P-CCNC: 13 U/L (ref 9–39)
ATRIAL RATE: 97 BPM
BASE EXCESS BLDA CALC-SCNC: 27.7 MMOL/L (ref -2–3)
BASOPHILS # BLD AUTO: 0.01 X10*3/UL (ref 0–0.1)
BASOPHILS NFR BLD AUTO: 0.1 %
BILIRUB SERPL-MCNC: 0.2 MG/DL (ref 0–1.2)
BODY TEMPERATURE: ABNORMAL
BUN SERPL-MCNC: 54 MG/DL (ref 6–23)
CA-I BLDA-SCNC: 1.22 MMOL/L (ref 1.1–1.33)
CALCIUM SERPL-MCNC: 8.5 MG/DL (ref 8.6–10.3)
CHLORIDE BLDA-SCNC: 95 MMOL/L (ref 98–107)
CHLORIDE SERPL-SCNC: 93 MMOL/L (ref 98–107)
CO2 SERPL-SCNC: >45 MMOL/L (ref 21–32)
CREAT SERPL-MCNC: 0.95 MG/DL (ref 0.5–1.05)
CRITICAL CALL TIME: 745
CRITICAL CALLED BY: ABNORMAL
CRITICAL CALLED TO: ABNORMAL
CRITICAL READ BACK: ABNORMAL
EGFRCR SERPLBLD CKD-EPI 2021: 63 ML/MIN/1.73M*2
EOSINOPHIL # BLD AUTO: 0.04 X10*3/UL (ref 0–0.4)
EOSINOPHIL NFR BLD AUTO: 0.5 %
ERYTHROCYTE [DISTWIDTH] IN BLOOD BY AUTOMATED COUNT: 13.6 % (ref 11.5–14.5)
GLUCOSE BLDA-MCNC: 119 MG/DL (ref 74–99)
GLUCOSE SERPL-MCNC: 111 MG/DL (ref 74–99)
HCO3 BLDA-SCNC: 57 MMOL/L (ref 22–26)
HCT VFR BLD AUTO: 32.2 % (ref 36–46)
HCT VFR BLD EST: 32 % (ref 36–46)
HGB BLD-MCNC: 9.6 G/DL (ref 12–16)
HGB BLDA-MCNC: 10.5 G/DL (ref 12–16)
IMM GRANULOCYTES # BLD AUTO: 0.12 X10*3/UL (ref 0–0.5)
IMM GRANULOCYTES NFR BLD AUTO: 1.6 % (ref 0–0.9)
INHALED O2 CONCENTRATION: 40 %
INR PPP: 2.5 (ref 0.9–1.1)
LACTATE BLDA-SCNC: 1.2 MMOL/L (ref 0.4–2)
LYMPHOCYTES # BLD AUTO: 1.04 X10*3/UL (ref 0.8–3)
LYMPHOCYTES NFR BLD AUTO: 13.5 %
MAGNESIUM SERPL-MCNC: 1.95 MG/DL (ref 1.6–2.4)
MCH RBC QN AUTO: 30.4 PG (ref 26–34)
MCHC RBC AUTO-ENTMCNC: 29.8 G/DL (ref 32–36)
MCV RBC AUTO: 102 FL (ref 80–100)
MONOCYTES # BLD AUTO: 0.86 X10*3/UL (ref 0.05–0.8)
MONOCYTES NFR BLD AUTO: 11.2 %
NEUTROPHILS # BLD AUTO: 5.63 X10*3/UL (ref 1.6–5.5)
NEUTROPHILS NFR BLD AUTO: 73.1 %
NRBC BLD-RTO: 0 /100 WBCS (ref 0–0)
OXYHGB MFR BLDA: 96.8 % (ref 94–98)
PCO2 BLDA: 90 MM HG (ref 38–42)
PH BLDA: 7.41 PH (ref 7.38–7.42)
PLATELET # BLD AUTO: 297 X10*3/UL (ref 150–450)
PO2 BLDA: 95 MM HG (ref 85–95)
POTASSIUM BLDA-SCNC: 5.4 MMOL/L (ref 3.5–5.3)
POTASSIUM SERPL-SCNC: 5.1 MMOL/L (ref 3.5–5.3)
PROT SERPL-MCNC: 5.4 G/DL (ref 6.4–8.2)
PROTHROMBIN TIME: 28.2 SECONDS (ref 9.8–12.8)
Q ONSET: 222 MS
QRS COUNT: 15 BEATS
QRS DURATION: 62 MS
QT INTERVAL: 360 MS
QTC CALCULATION(BAZETT): 442 MS
QTC FREDERICIA: 414 MS
R AXIS: 100 DEGREES
RBC # BLD AUTO: 3.16 X10*6/UL (ref 4–5.2)
SAO2 % BLDA: 98 % (ref 94–100)
SODIUM BLDA-SCNC: 137 MMOL/L (ref 136–145)
SODIUM SERPL-SCNC: 143 MMOL/L (ref 136–145)
SPECIMEN DRAWN FROM PATIENT: ABNORMAL
T AXIS: 27 DEGREES
T OFFSET: 402 MS
UFH PPP CHRO-ACNC: 0.6 IU/ML
VENTRICULAR RATE: 91 BPM
WBC # BLD AUTO: 7.7 X10*3/UL (ref 4.4–11.3)

## 2024-10-27 PROCEDURE — 85025 COMPLETE CBC W/AUTO DIFF WBC: CPT | Performed by: INTERNAL MEDICINE

## 2024-10-27 PROCEDURE — 2500000001 HC RX 250 WO HCPCS SELF ADMINISTERED DRUGS (ALT 637 FOR MEDICARE OP)

## 2024-10-27 PROCEDURE — 94660 CPAP INITIATION&MGMT: CPT

## 2024-10-27 PROCEDURE — 36600 WITHDRAWAL OF ARTERIAL BLOOD: CPT

## 2024-10-27 PROCEDURE — 80053 COMPREHEN METABOLIC PANEL: CPT | Performed by: INTERNAL MEDICINE

## 2024-10-27 PROCEDURE — 2500000005 HC RX 250 GENERAL PHARMACY W/O HCPCS

## 2024-10-27 PROCEDURE — 94640 AIRWAY INHALATION TREATMENT: CPT

## 2024-10-27 PROCEDURE — 2500000002 HC RX 250 W HCPCS SELF ADMINISTERED DRUGS (ALT 637 FOR MEDICARE OP, ALT 636 FOR OP/ED): Performed by: STUDENT IN AN ORGANIZED HEALTH CARE EDUCATION/TRAINING PROGRAM

## 2024-10-27 PROCEDURE — 83735 ASSAY OF MAGNESIUM: CPT | Performed by: INTERNAL MEDICINE

## 2024-10-27 PROCEDURE — 99238 HOSP IP/OBS DSCHRG MGMT 30/<: CPT | Performed by: INTERNAL MEDICINE

## 2024-10-27 PROCEDURE — 85610 PROTHROMBIN TIME: CPT | Performed by: INTERNAL MEDICINE

## 2024-10-27 PROCEDURE — 36415 COLL VENOUS BLD VENIPUNCTURE: CPT | Performed by: STUDENT IN AN ORGANIZED HEALTH CARE EDUCATION/TRAINING PROGRAM

## 2024-10-27 PROCEDURE — 84132 ASSAY OF SERUM POTASSIUM: CPT | Performed by: INTERNAL MEDICINE

## 2024-10-27 PROCEDURE — 85520 HEPARIN ASSAY: CPT | Performed by: STUDENT IN AN ORGANIZED HEALTH CARE EDUCATION/TRAINING PROGRAM

## 2024-10-27 RX ORDER — TORSEMIDE 20 MG/1
20 TABLET ORAL DAILY
Start: 2024-10-28

## 2024-10-27 ASSESSMENT — COGNITIVE AND FUNCTIONAL STATUS - GENERAL
MOBILITY SCORE: 13
TOILETING: A LITTLE
DRESSING REGULAR UPPER BODY CLOTHING: A LOT
DAILY ACTIVITIY SCORE: 16
DRESSING REGULAR LOWER BODY CLOTHING: A LOT
WALKING IN HOSPITAL ROOM: A LOT
HELP NEEDED FOR BATHING: A LOT
CLIMB 3 TO 5 STEPS WITH RAILING: TOTAL
DAILY ACTIVITIY SCORE: 16
TURNING FROM BACK TO SIDE WHILE IN FLAT BAD: A LITTLE
TURNING FROM BACK TO SIDE WHILE IN FLAT BAD: A LITTLE
STANDING UP FROM CHAIR USING ARMS: A LOT
MOVING FROM LYING ON BACK TO SITTING ON SIDE OF FLAT BED WITH BEDRAILS: A LITTLE
WALKING IN HOSPITAL ROOM: A LOT
DRESSING REGULAR UPPER BODY CLOTHING: A LOT
TOILETING: A LITTLE
HELP NEEDED FOR BATHING: A LOT
MOBILITY SCORE: 14
PERSONAL GROOMING: A LITTLE
MOVING FROM LYING ON BACK TO SITTING ON SIDE OF FLAT BED WITH BEDRAILS: A LITTLE
MOVING TO AND FROM BED TO CHAIR: A LITTLE
DRESSING REGULAR LOWER BODY CLOTHING: A LOT
PERSONAL GROOMING: A LITTLE
CLIMB 3 TO 5 STEPS WITH RAILING: TOTAL
STANDING UP FROM CHAIR USING ARMS: A LOT
MOVING TO AND FROM BED TO CHAIR: A LOT

## 2024-10-27 ASSESSMENT — PAIN SCALES - GENERAL: PAINLEVEL_OUTOF10: 0 - NO PAIN

## 2024-10-27 ASSESSMENT — PAIN SCALES - WONG BAKER: WONGBAKER_NUMERICALRESPONSE: NO HURT

## 2024-10-27 NOTE — CARE PLAN
Problem: Pain - Adult  Goal: Verbalizes/displays adequate comfort level or baseline comfort level  Outcome: Progressing     Problem: Safety - Adult  Goal: Free from fall injury  Outcome: Progressing     Problem: Discharge Planning  Goal: Discharge to home or other facility with appropriate resources  Outcome: Progressing     Problem: Chronic Conditions and Co-morbidities  Goal: Patient's chronic conditions and co-morbidity symptoms are monitored and maintained or improved  Outcome: Progressing     Problem: Skin  Goal: Decreased wound size/increased tissue granulation at next dressing change  Outcome: Progressing  Goal: Participates in plan/prevention/treatment measures  Outcome: Progressing  Goal: Prevent/manage excess moisture  Outcome: Progressing  Goal: Prevent/minimize sheer/friction injuries  Outcome: Progressing  Goal: Promote/optimize nutrition  Outcome: Progressing  Goal: Promote skin healing  Outcome: Progressing     Problem: Pain  Goal: Takes deep breaths with improved pain control throughout the shift  Outcome: Progressing  Goal: Turns in bed with improved pain control throughout the shift  Outcome: Progressing  Goal: Walks with improved pain control throughout the shift  Outcome: Progressing  Goal: Performs ADL's with improved pain control throughout shift  Outcome: Progressing  Goal: Participates in PT with improved pain control throughout the shift  Outcome: Progressing  Goal: Free from opioid side effects throughout the shift  Outcome: Progressing  Goal: Free from acute confusion related to pain meds throughout the shift  Outcome: Progressing   The patient's goals for the shift include      The clinical goals for the shift include pt will remain hemodymanically stable throughout shift

## 2024-10-27 NOTE — PROGRESS NOTES
Physical Therapy                 Therapy Communication Note    Patient Name: Maria Guadalupe Marks  MRN: [id number]  Department: [facility]  Room: [address] [place] [address]  Today's Date: 10/27/2024     Discipline: Physical Therapy    Missed Visit Reason: Missed Visit Reason: Patient in a medical procedure (Per RN pt currently on Bipap, will reattempt as able. Attempted at 1109AM)    Missed Time: Attempt

## 2024-10-27 NOTE — PROGRESS NOTES
Respiratory Therapy Note: pt. Awake and alert, thought it was dinner time instead of breakfast. Denies any sob. Barely wore bipap/cpap last night (received in report)  ABG drawn during morning breathing tx, fully compensated respiratory acidosis.   PH 7.41, PCO2 90,PO2 95, HC03 57,   Placed on 2L N/C post tx  NELLY Lutz informed of abg results

## 2024-10-27 NOTE — CARE PLAN
The patient's goals for the shift include      The clinical goals for the shift include Pt will have increased mobility by the end of shift.    Over the shift, the patient did not make progress toward the following goals. Barriers to progression include Pt will tolerate ambulating to bsc. Recommendations to address these barriers include continue with poc  .

## 2024-10-27 NOTE — PROGRESS NOTES
Occupational Therapy                 Therapy Communication Note    Patient Name: Maria Guadalupe Marks  MRN: [id number]  Department: [facility]  Room: [facility]  Today's Date: 10/27/2024     Discipline: Occupational Therapy    Missed Visit Reason:  (multiple attempts, patient resting on BiPap. scheduled for SNF transfer later this afternoon.)

## 2024-10-27 NOTE — PROGRESS NOTES
Social Work Note Per physician order pt is to discharge to SNF today.  Transportation via ambulance (4L02) arranged for 2:00PM pickup.  DSC submitted 7000.  Number for report given to RN along with transport time.  Facility advised of transport time.  Spoke with dtr and advised of transport time.  LINDA Rios

## 2024-10-28 NOTE — DISCHARGE SUMMARY
Discharge Diagnosis:   Closed traumatic nondisplaced fracture of neck of left femur, initial encounter     Discharge Date: 10/28/24   Admission Date: 10/20/2024     Hospital Course:   Maria Guadalupe Marks is a pleasant 74 y.o. female came to the emergency department after a fall.  She get out of her bed and fell on her left side and sustained a left hip fracture which was surgically repaired by the orthopedics team.  Postoperatively she went into acute hypoxic and hypercapnic respiratory failure and needed pressors.  Patient does have a history of COPD she was requiring more supplemental oxygen than her home need of 2 L via nasal cannula.  Transiently patient was in metabolic encephalopathy which has been corrected with IV fluid and antibiotic.  She does have atrial fibrillation for which she was on warfarin.  She had subtherapeutic INR.  Patient does have also history of factor V Leyden deficiency.  For time being she was on IV heparin drip until her INR became therapeutic.  Patient was evaluated by PT OT and definitely she was a candidate for skilled nursing facility.  She needed BiPAP during the nighttime for high PCO2.  Normally patient is a CO2 retainer her baseline pCO2 is 60.  On the day of discharge she was using BiPAP and the nursing home that she was sent to was advised to encourage her to use BiPAP more often than usual.  She would need pulmonary rehab as well.    Discharge Physical Exam:    HEENT:  Atraumatic, PERRL. Conjunctivae clear.  Moist nasal mucous membranes.   Neck:  Supple without thyromegaly or lymphadenopathy.  Lungs:  Clear to auscultation without rales, rhonchi, or rub.  Heart:  RRR, S1, S2, without M.  Abdomen:  Soft, non tender, no organ enlargement.  Bowel sounds present . No CVA tenderness.  Extremities:  No edema. No calf swelling or tenderness.    Skin:  No rash, ecchymosis or erythema.    On the day of discharge patient was ambulating well, eating and drinking well. Physical exam was  essentially benign and was at baseline. Blood chemistry and other lab testing results were at acceptable for discharge. Patient remained hemodynamically stable and with no further acute concern. Patient verbalized understanding of discharge medications and the expected adverse effects, if any.       The detail of the hospital course  including admission H&P, consult recommendations, biochemical lab results, imaging studies can be found in EHR of [facility]. Total 29 minutes time was spent on discharge exam, medicine reconciliation, discharge instructions and preparing discharge summary.     Home Medications After Discharge    Scheduled   calcium carbonate-vitamin D3 500 mg-5 mcg (200 unit) tablet, Take 1 tablet by mouth once daily.   carvedilol (Coreg) 6.25 mg tablet, Take 1 tablet (6.25 mg) by mouth every 12 hours.   docusate sodium (Colace) 100 mg capsule, Take 1 capsule (100 mg) by mouth 2 times a day for 10 days.   DULoxetine (Cymbalta) 60 mg DR capsule, Take 1 capsule (60 mg) by mouth once daily. Do not crush or chew.   ipratropium-albuteroL (Duo-Neb) 0.5-2.5 mg/3 mL nebulizer solution, Inhale 3 mL 3 times a day.   lovastatin (Mevacor) 40 mg tablet, Take 1 tablet (40 mg) by mouth once daily at bedtime.   magnesium oxide (Mag-Ox) 400 mg tablet, Take 1.25 tablets (500 mg) by mouth once daily.   montelukast (Singulair) 10 mg tablet, Take 1 tablet (10 mg) by mouth once daily at bedtime.   neomycin-polymyxin B-dexameth (Polydex) 3.5 mg/g-10,000 unit/g-0.1 % ointment ophthalmic ointment, Apply to right eye 4 times a day. Apply 1/4 inch ribbon to right eye lid 4 times a day and at bed   torsemide (Demadex) 20 mg tablet, Take 1 tablet (20 mg) by mouth once daily.    PRN   acetaminophen (Tylenol 8 HOUR) 650 mg ER tablet, Take 1 tablet (650 mg) by mouth if needed.   albuterol 90 mcg/actuation inhaler, Inhale 2 puffs every 4 hours if needed.   cyclobenzaprine (Flexeril) 5 mg tablet, Take 1 tablet (5 mg) by  mouth 3 times a day as needed for muscle spasms for up to 7 days.   HYDROcodone-acetaminophen (Norco) 5-325 mg tablet, Take 1 tablet by mouth every 6 hours if needed for severe pain (7 - 10) or moderate pain (4 - 6) for up to 7 days.   nystatin (Mycostatin) 100,000 unit/gram powder, Apply 1 Application topically if needed for rash.    No Frequency   losartan (Cozaar) 50 mg tablet, Take by mouth.   metOLazone (Zaroxolyn) 2.5 mg tablet, Take 1 tablet (2.5 mg) by mouth.   pramipexole (Mirapex) 0.5 mg tablet, TAKE 1 TABLET BY MOUTH FOUR TIMES DAILY AS NEEDED (RESTLESS LEG).   torsemide (Demadex) 20 mg tablet, 1-2  tablets daily for leg edema   warfarin (Coumadin) 2.5 mg tablet, Take by mouth.   warfarin (Coumadin) 5 mg tablet, Take 7.5 mg on MonWed and 5 mg all other days of the week or as directed by [facility]mad[facility] Clinic.       Outpatient Follow up:   Future Appointments   Date Time Provider Department Center   11/6/2024  9:15 AM Eligio Whitten MD [id number] [place]     PCP: Rain Avila MD   The transitional care management team of [facility] will contact patient.    Patient was also advised to call PCP's office for hospital discharge follow-up in 7-10 days from today.          PS: This note was completed using Dragon voice recognition technology and may include unintended errors with respect to translation of words, typographical or grammar errors which may not have been identified while finalizing the chart.

## 2024-11-05 NOTE — PROGRESS NOTES
History: Maria Guadalupe Marks is here for her left hip. She is 2 weeks out from a left partial hip arthroplasty.     Exam: []    Radiographs: []    Impression: []    Plan: []  All questions were answered with the patient.       Scribe Attestation  By signing my name below, IMary Scribe   attest that this documentation has been prepared under the direction and in the presence of Eligio Whitten MD.

## 2024-11-06 ENCOUNTER — APPOINTMENT (OUTPATIENT)
Dept: ORTHOPEDIC SURGERY | Facility: CLINIC | Age: 74
End: 2024-11-06
Payer: MEDICARE

## 2024-11-21 NOTE — PROGRESS NOTES
History: Maria Guadalupe Marks is here for a post-op visit of her hip.  She is one month out from a hip hemiarthroplasty for a femoral neck fracture.  She had breathing issues postoperatively and was readmitted to [facility] after hospital discharge.  She was in ICU for 2 weeks nonweightbearing.  She is now staying at her daughter's house who is a nurse.    Physical Exam: The wound is healing nicely.  There is no redness or drainage.  There is a 2 cm area in the middle of the wound that is somewhat macerated.  There is mild erythema surrounding this area.  Swelling and ecchymosis are normal for this stage of healing. Foot and ankle move well.  No numbness or tingling in the leg.     Radiographs: AP and lateral views of the hip show good alignment of the hip hemiarthroplasty with no evidence of loosening.     Assessment: Stable left hip hemiarthroplasty for a femoral neck fracture, one month out    Plan:   She states she has had issues with wound healing in the past and given some of the maceration and erythema to her incision today I would like her to be on a week of Bactrim.  Her daughter is a nurse and has been dressing this area with Xeroform which she can continue doing.    She can shower and get the incision wet with soapy water.  She can WBAT and work with PT on strengthening and other modalities.  She does have home therapy coming out to her daughter's house.    We reviewed hip precautions and she will continue with these for 12 weeks.   She will continue with an icing regimen.  She will follow up in one month with two view hip x-rays.  She will call to be seen sooner if having any wound drainage or increased redness.  All questions were answered with the patient.

## 2024-11-22 ENCOUNTER — OFFICE VISIT (OUTPATIENT)
Dept: ORTHOPEDIC SURGERY | Facility: CLINIC | Age: 74
End: 2024-11-22
Payer: MEDICARE

## 2024-11-22 ENCOUNTER — HOSPITAL ENCOUNTER (OUTPATIENT)
Dept: RADIOLOGY | Facility: CLINIC | Age: 74
Discharge: HOME | End: 2024-11-22
Payer: MEDICARE

## 2024-11-22 DIAGNOSIS — S72.002A: Primary | ICD-10-CM

## 2024-11-22 DIAGNOSIS — Z96.649 S/P HIP HEMIARTHROPLASTY: ICD-10-CM

## 2024-11-22 PROCEDURE — 73502 X-RAY EXAM HIP UNI 2-3 VIEWS: CPT | Mod: LT

## 2024-11-22 PROCEDURE — 99211 OFF/OP EST MAY X REQ PHY/QHP: CPT | Performed by: ORTHOPAEDIC SURGERY

## 2024-11-22 RX ORDER — SULFAMETHOXAZOLE AND TRIMETHOPRIM 800; 160 MG/1; MG/1
1 TABLET ORAL 2 TIMES DAILY
Qty: 14 TABLET | Refills: 0 | Status: SHIPPED | OUTPATIENT
Start: 2024-11-22 | End: 2024-11-29

## 2024-11-22 RX ORDER — FERROUS SULFATE 325(65) MG
1 TABLET ORAL
COMMUNITY
Start: 2024-11-21 | End: 2025-11-21

## 2024-11-22 RX ORDER — FUROSEMIDE 40 MG/1
1 TABLET ORAL
COMMUNITY
Start: 2024-08-23

## 2024-12-19 NOTE — PROGRESS NOTES
History: Maria Guadalupe  is here for a post-op visit of her hip.  She is 2 months out from a hip hemiarthroplasty for a femoral neck fracture. She had breathing issues postoperatively and was readmitted to [facility] after hospital discharge.  She was in ICU for 2 weeks nonweightbearing.  She is now staying at her daughter's house who is a nurse. She completed her physical therapy but is continuing at home.     Past medical history: Multiple  Medications: Multiple  Allergies: No known drug allergies    Please refer to the intake H&P regarding the patient's review of systems, family history and social history as was done today    HEENT: Normal  Lungs: Clear to auscultation  Heart: RRR  Abdomen: Soft, nontender  Skin: clear  Extremity: She has mild swelling in the wound today but no obvious redness or wound irritation.  She has excellent hip motion without any pain.  She has good strength with resisted hip flexion maneuvers.  No numbness or tingling.  Contralateral exam is normal for strength, motion, stability and neurovascular assessment.    Radiographs: XR left hip from today demonstrates stable alignment of her hip hemiarthroplasty in all planes    Assessment: Stable left hip hemiarthroplasty for a femoral neck fracture, two months out     Plan:   The patient has been healing appropriately since surgery. We reviewed hip precautions and she will continue with an icing regimen. She can continue with physical therapy and strengthening.  If doing well over the next 6 weeks she can follow-up as needed.  She will continue to monitor her wound for any recurrent swelling or irritation although at this time it appears to be more normal postoperative swelling without hematoma or seroma.      All questions were answered today with the patient.    This note was generated with voice recognition software and may contain grammatical errors.

## 2024-12-20 ENCOUNTER — OFFICE VISIT (OUTPATIENT)
Dept: ORTHOPEDIC SURGERY | Facility: CLINIC | Age: 74
End: 2024-12-20
Payer: MEDICARE

## 2024-12-20 ENCOUNTER — HOSPITAL ENCOUNTER (OUTPATIENT)
Dept: RADIOLOGY | Facility: CLINIC | Age: 74
Discharge: HOME | End: 2024-12-20
Payer: MEDICARE

## 2024-12-20 DIAGNOSIS — S72.002A: Primary | ICD-10-CM

## 2024-12-20 DIAGNOSIS — S72.002A: ICD-10-CM

## 2024-12-20 PROCEDURE — 73502 X-RAY EXAM HIP UNI 2-3 VIEWS: CPT | Mod: LT

## 2024-12-20 PROCEDURE — 99211 OFF/OP EST MAY X REQ PHY/QHP: CPT | Performed by: ORTHOPAEDIC SURGERY

## (undated) DEVICE — DRESSING, MEPILEX BORDER, POST-OP AG, 4 X 12 IN

## (undated) DEVICE — CEMENT MIX KIT, REVOLUTION, W BREAKAWAY-HIP

## (undated) DEVICE — SUTURE, ETHIBOND XTRA, 5 CCS, GRN/BR, LF

## (undated) DEVICE — CAUTERY, PENCIL, PUSH BUTTON, SMOKE EVAC, 70MM

## (undated) DEVICE — GLOVE, SURGICAL, PROTEXIS PI BLUE W/NEUTHERA, 7.0, PF, LF

## (undated) DEVICE — STRAP, ARM BOARD, 32 X 1.5

## (undated) DEVICE — GLOVE, SURGICAL, PROTEXIS PI , 7.0, PF, LF

## (undated) DEVICE — HOOD, SURGICAL, FLYTE, T7

## (undated) DEVICE — WOUND SYSTEM, DEBRIDEMENT & CLEANING, O.R DUOPAK

## (undated) DEVICE — Device

## (undated) DEVICE — SUTURE, VICRYL PLUS, 1, 8X27IN, CT-1, CR, UND, BRAIDED

## (undated) DEVICE — PROTECTOR, NERVE, ULNAR, PINK

## (undated) DEVICE — STRAP, VELCRO, BODY, 4 X 60IN, NS

## (undated) DEVICE — TRAY, SURESTEP, SILICONE DRAINAGE BAG, STATLOCK, 16FR

## (undated) DEVICE — PILLOW, ABDUCTION, LARGE, 25 X 18 X 6.25 IN

## (undated) DEVICE — PREP KIT, BONE, BIO-PREP

## (undated) DEVICE — IRRIGATION SYSTEM, BRUSH, W/SUCTION, FEMORAL CANAL

## (undated) DEVICE — BATH BLANKET STERILE

## (undated) DEVICE — BLADE, SAW, SAGITTAL, 21 X 84.5 X 0.89 MM, STAINLESS STEEL, STERILE

## (undated) DEVICE — STAPLER, SKIN, PLUS, WIDE, 35

## (undated) DEVICE — GLOVE, SURGICAL, PROTEXIS PI , 7.5, PF, LF

## (undated) DEVICE — DRAPE, INCISE, ANTIMICROBIAL, IOBAN 2, STERI DRAPE, 23 X 33 IN, DISPOSABLE, STERILE

## (undated) DEVICE — POSITIONER, CRADLE, HEAD, MEDC, FOAM SLOTTED

## (undated) DEVICE — NEEDLE, SAFETY, 18 G X 1.5 IN

## (undated) DEVICE — PREP, IODOPHOR, W/ALCOHOL, DURAPREP, W/APPLICATOR, 26 CC

## (undated) DEVICE — SOLUTION, INJECTION, USP, SODIUM CHLORIDE 0.9%, .9, NACL, 1000 ML, BAG

## (undated) DEVICE — MANIFOLD, 4 PORT NEPTUNE STANDARD

## (undated) DEVICE — TIP, SUCTION, YANKAUER, FLEXIBLE